# Patient Record
Sex: MALE | Race: BLACK OR AFRICAN AMERICAN | NOT HISPANIC OR LATINO | Employment: FULL TIME | URBAN - METROPOLITAN AREA
[De-identification: names, ages, dates, MRNs, and addresses within clinical notes are randomized per-mention and may not be internally consistent; named-entity substitution may affect disease eponyms.]

---

## 2023-05-10 ENCOUNTER — HOSPITAL ENCOUNTER (EMERGENCY)
Facility: HOSPITAL | Age: 38
Discharge: HOME | End: 2023-05-10
Attending: EMERGENCY MEDICINE
Payer: COMMERCIAL

## 2023-05-10 VITALS
TEMPERATURE: 98 F | HEART RATE: 61 BPM | RESPIRATION RATE: 18 BRPM | DIASTOLIC BLOOD PRESSURE: 66 MMHG | BODY MASS INDEX: 34.33 KG/M2 | SYSTOLIC BLOOD PRESSURE: 126 MMHG | HEIGHT: 68 IN | WEIGHT: 226.5 LBS | OXYGEN SATURATION: 99 %

## 2023-05-10 DIAGNOSIS — R06.02 SOB (SHORTNESS OF BREATH): ICD-10-CM

## 2023-05-10 DIAGNOSIS — R11.2 NAUSEA & VOMITING: ICD-10-CM

## 2023-05-10 LAB
ALBUMIN SERPL-MCNC: 4.5 G/DL (ref 3.5–5)
ALBUMIN/GLOB SERPL: 1.4 RATIO (ref 1.1–2)
ALP SERPL-CCNC: 43 UNIT/L (ref 40–150)
ALT SERPL-CCNC: 12 UNIT/L (ref 0–55)
APPEARANCE UR: CLEAR
AST SERPL-CCNC: 15 UNIT/L (ref 5–34)
BACTERIA #/AREA URNS AUTO: ABNORMAL /HPF
BASOPHILS # BLD AUTO: 0.02 X10(3)/MCL
BASOPHILS NFR BLD AUTO: 0.2 %
BILIRUB UR QL STRIP.AUTO: NEGATIVE MG/DL
BILIRUBIN DIRECT+TOT PNL SERPL-MCNC: 0.4 MG/DL
BUN SERPL-MCNC: 12.2 MG/DL (ref 8.9–20.6)
CALCIUM SERPL-MCNC: 9.9 MG/DL (ref 8.4–10.2)
CHLORIDE SERPL-SCNC: 108 MMOL/L (ref 98–107)
CO2 SERPL-SCNC: 25 MMOL/L (ref 22–29)
COLOR UR AUTO: YELLOW
CREAT SERPL-MCNC: 1.52 MG/DL (ref 0.73–1.18)
EOSINOPHIL # BLD AUTO: 0.02 X10(3)/MCL (ref 0–0.9)
EOSINOPHIL NFR BLD AUTO: 0.2 %
ERYTHROCYTE [DISTWIDTH] IN BLOOD BY AUTOMATED COUNT: 12.5 % (ref 11.5–17)
FLUAV AG UPPER RESP QL IA.RAPID: NOT DETECTED
FLUBV AG UPPER RESP QL IA.RAPID: NOT DETECTED
GFR SERPLBLD CREATININE-BSD FMLA CKD-EPI: >60 MLS/MIN/1.73/M2
GLOBULIN SER-MCNC: 3.3 GM/DL (ref 2.4–3.5)
GLUCOSE SERPL-MCNC: 101 MG/DL (ref 74–100)
GLUCOSE UR QL STRIP.AUTO: NORMAL MG/DL
HCT VFR BLD AUTO: 43.7 % (ref 42–52)
HGB BLD-MCNC: 14.2 G/DL (ref 14–18)
HYALINE CASTS #/AREA URNS LPF: ABNORMAL /LPF
IMM GRANULOCYTES # BLD AUTO: 0.02 X10(3)/MCL (ref 0–0.04)
IMM GRANULOCYTES NFR BLD AUTO: 0.2 %
KETONES UR QL STRIP.AUTO: ABNORMAL MG/DL
LEUKOCYTE ESTERASE UR QL STRIP.AUTO: NEGATIVE UNIT/L
LIPASE SERPL-CCNC: 15 U/L
LYMPHOCYTES # BLD AUTO: 1.28 X10(3)/MCL (ref 0.6–4.6)
LYMPHOCYTES NFR BLD AUTO: 14.5 %
MCH RBC QN AUTO: 27.5 PG (ref 27–31)
MCHC RBC AUTO-ENTMCNC: 32.5 G/DL (ref 33–36)
MCV RBC AUTO: 84.7 FL (ref 80–94)
MONOCYTES # BLD AUTO: 0.63 X10(3)/MCL (ref 0.1–1.3)
MONOCYTES NFR BLD AUTO: 7.1 %
MUCOUS THREADS URNS QL MICRO: ABNORMAL /LPF
NEUTROPHILS # BLD AUTO: 6.87 X10(3)/MCL (ref 2.1–9.2)
NEUTROPHILS NFR BLD AUTO: 77.8 %
NITRITE UR QL STRIP.AUTO: NEGATIVE
NRBC BLD AUTO-RTO: 0 %
PH UR STRIP.AUTO: 7.5 [PH]
PLATELET # BLD AUTO: 310 X10(3)/MCL (ref 130–400)
PMV BLD AUTO: 9.6 FL (ref 7.4–10.4)
POTASSIUM SERPL-SCNC: 3.7 MMOL/L (ref 3.5–5.1)
PROT SERPL-MCNC: 7.8 GM/DL (ref 6.4–8.3)
PROT UR QL STRIP.AUTO: ABNORMAL MG/DL
RBC # BLD AUTO: 5.16 X10(6)/MCL (ref 4.7–6.1)
RBC #/AREA URNS AUTO: ABNORMAL /HPF
RBC UR QL AUTO: NEGATIVE UNIT/L
SARS-COV-2 RNA RESP QL NAA+PROBE: NOT DETECTED
SODIUM SERPL-SCNC: 142 MMOL/L (ref 136–145)
SP GR UR STRIP.AUTO: 1.03
SQUAMOUS #/AREA URNS LPF: ABNORMAL /HPF
UROBILINOGEN UR STRIP-ACNC: ABNORMAL MG/DL
WBC # SPEC AUTO: 8.84 X10(3)/MCL (ref 4.5–11.5)
WBC #/AREA URNS AUTO: ABNORMAL /HPF

## 2023-05-10 PROCEDURE — 81001 URINALYSIS AUTO W/SCOPE: CPT | Performed by: NURSE PRACTITIONER

## 2023-05-10 PROCEDURE — 93005 ELECTROCARDIOGRAM TRACING: CPT

## 2023-05-10 PROCEDURE — 96361 HYDRATE IV INFUSION ADD-ON: CPT

## 2023-05-10 PROCEDURE — 85025 COMPLETE CBC W/AUTO DIFF WBC: CPT | Performed by: NURSE PRACTITIONER

## 2023-05-10 PROCEDURE — 25000003 PHARM REV CODE 250: Performed by: NURSE PRACTITIONER

## 2023-05-10 PROCEDURE — 99285 EMERGENCY DEPT VISIT HI MDM: CPT | Mod: 25

## 2023-05-10 PROCEDURE — 80053 COMPREHEN METABOLIC PANEL: CPT | Performed by: NURSE PRACTITIONER

## 2023-05-10 PROCEDURE — 0240U COVID/FLU A&B PCR: CPT | Performed by: NURSE PRACTITIONER

## 2023-05-10 PROCEDURE — 96374 THER/PROPH/DIAG INJ IV PUSH: CPT

## 2023-05-10 PROCEDURE — 63600175 PHARM REV CODE 636 W HCPCS: Performed by: NURSE PRACTITIONER

## 2023-05-10 PROCEDURE — 83690 ASSAY OF LIPASE: CPT | Performed by: NURSE PRACTITIONER

## 2023-05-10 RX ORDER — ONDANSETRON 2 MG/ML
4 INJECTION INTRAMUSCULAR; INTRAVENOUS
Status: COMPLETED | OUTPATIENT
Start: 2023-05-10 | End: 2023-05-10

## 2023-05-10 RX ORDER — ONDANSETRON 4 MG/1
4 TABLET, ORALLY DISINTEGRATING ORAL EVERY 8 HOURS PRN
Qty: 9 TABLET | Refills: 0 | Status: SHIPPED | OUTPATIENT
Start: 2023-05-10 | End: 2023-05-13

## 2023-05-10 RX ORDER — DICYCLOMINE HYDROCHLORIDE 20 MG/1
20 TABLET ORAL 2 TIMES DAILY
Qty: 20 TABLET | Refills: 0 | Status: SHIPPED | OUTPATIENT
Start: 2023-05-10 | End: 2023-05-20

## 2023-05-10 RX ADMIN — SODIUM CHLORIDE 1000 ML: 9 INJECTION, SOLUTION INTRAVENOUS at 09:05

## 2023-05-10 RX ADMIN — ONDANSETRON 4 MG: 2 INJECTION INTRAMUSCULAR; INTRAVENOUS at 09:05

## 2023-05-10 NOTE — DISCHARGE INSTRUCTIONS
Increase fluid intake, clear liquids x 12 hours. Advance diet slowly.  Follow up with [facility] as discussed to obtain a PCP.  Take medication as prescribed.

## 2023-05-10 NOTE — ED PROVIDER NOTES
Encounter Date: 5/10/2023       History     Chief Complaint   Patient presents with    Abdominal Pain     Pt in with complaints of abdominal pain x 1 week with vomiting and diarrhea that started about 3 days ago.  Pt also reports lack of appetite and feeling sluggish.     Pt is a 37 y.o. male who presents to the [facility] ED complaining of abdominal cramping, nausea, diarrhea, and vomiting. Symptoms present x 1 week. Denies chest pain, SOB, or fever. Admits to decreased appetite since symptoms began. Denies sick contacts at home. Denies chronic medical conditions.     Review of patient's allergies indicates:  No Known Allergies  No past medical history on file.  No past surgical history on file.  No family history on file.  Social History     Tobacco Use    Smoking status: Some Days     Types: Cigars    Smokeless tobacco: Never   Substance Use Topics    Drug use: Yes     Types: Marijuana     Review of Systems   Constitutional:  Negative for chills, diaphoresis, fatigue and fever.   HENT:  Negative for facial swelling, postnasal drip, rhinorrhea, sinus pressure, sinus pain, sore throat and trouble swallowing.    Respiratory:  Negative for cough, chest tightness, shortness of breath and wheezing.    Cardiovascular:  Negative for chest pain, palpitations and leg swelling.   Gastrointestinal:  Positive for abdominal pain, diarrhea, nausea and vomiting.   Genitourinary:  Negative for dysuria, flank pain, hematuria and urgency.   Musculoskeletal:  Negative for arthralgias, back pain and myalgias.   Skin:  Negative for color change and rash.   Neurological:  Negative for dizziness, syncope, weakness and headaches.   Hematological:  Does not bruise/bleed easily.   All other systems reviewed and are negative.    Physical Exam     Initial Vitals [05/10/23 0933]   BP Pulse Resp Temp SpO2   125/78 (!) 57 14 98.3 °F (36.8 °C) 100 %      MAP       --         Physical Exam    Nursing note and vitals reviewed.  Constitutional: Vital  signs are normal. He appears well-developed and well-nourished.   HENT:   Head: Normocephalic.   Nose: Nose normal.   Mouth/Throat: Oropharynx is clear and moist.   Eyes: Conjunctivae and EOM are normal. Pupils are equal, round, and reactive to light.   Neck: Neck supple.   Normal range of motion.  Cardiovascular:  Normal rate, regular rhythm, normal heart sounds and intact distal pulses.           Pulmonary/Chest: Effort normal and breath sounds normal. No respiratory distress. He has no wheezes. He has no rhonchi. He has no rales. He exhibits no tenderness.   Abdominal: Abdomen is soft and flat. Bowel sounds are normal. There is generalized abdominal tenderness. There is no rebound, no guarding, no tenderness at McBurney's point and negative Barakat's sign.   Musculoskeletal:         General: Normal range of motion.      Cervical back: Normal range of motion and neck supple.     Neurological: He is alert and oriented to person, place, and time. He has normal strength.   Skin: Skin is warm and dry. Capillary refill takes less than 2 seconds.   Psychiatric: He has a normal mood and affect. His behavior is normal. Judgment and thought content normal.       ED Course   Procedures  Labs Reviewed   URINALYSIS, REFLEX TO URINE CULTURE - Abnormal; Notable for the following components:       Result Value    Protein, UA 1+ (*)     Ketones, UA 3+ (*)     Urobilinogen, UA 1+ (*)     Bacteria, UA Trace (*)     Squamous Epithelial Cells, UA Trace (*)     Mucous, UA Moderate (*)     All other components within normal limits   COMPREHENSIVE METABOLIC PANEL - Abnormal; Notable for the following components:    Chloride 108 (*)     Glucose Level 101 (*)     Creatinine 1.52 (*)     All other components within normal limits   CBC WITH DIFFERENTIAL - Abnormal; Notable for the following components:    MCHC 32.5 (*)     All other components within normal limits   LIPASE - Normal   CBC W/ AUTO DIFFERENTIAL    Narrative:     The following  orders were created for panel order CBC Auto Differential.  Procedure                               Abnormality         Status                     ---------                               -----------         ------                     CBC with Differential[[id number]]        Abnormal            Final result                 Please view results for these tests on the individual orders.   EXTRA TUBES    Narrative:     The following orders were created for panel order EXTRA TUBES.  Procedure                               Abnormality         Status                     ---------                               -----------         ------                     Light Blue Top Hold[[id number]]                              In process                 Red Top Hold[[id number]]                                     In process                   Please view results for these tests on the individual orders.   LIGHT BLUE TOP HOLD   RED TOP HOLD   COVID/FLU A&B PCR        ECG Results              EKG 12-lead (Chest Pain) Age >30 (In process)  Result time 05/10/23 09:52:31      In process by Interface, Lab In [facility] (05/10/23 09:52:31)                   Narrative:    Test Reason : R07.9,    Vent. Rate : 058 BPM     Atrial Rate : 058 BPM     P-R Int : 138 ms          QRS Dur : 090 ms      QT Int : 422 ms       P-R-T Axes : 027 073 046 degrees     QTc Int : 414 ms    Sinus bradycardia  Otherwise normal ECG  No previous ECGs available    Referred By:             Confirmed By:                                   Imaging Results              X-Ray Chest PA And Lateral (Final result)  Result time 05/10/23 11:04:59      Final result by Chivo Ceron MD (05/10/23 11:04:59)                   Impression:      No acute cardiopulmonary process identified.      Electronically signed by: Chivo Ceron  Date:    05/10/2023  Time:    11:04               Narrative:    EXAMINATION:  XR CHEST PA AND LATERAL    CLINICAL HISTORY:  Shortness of  breath    TECHNIQUE:  Two-view    COMPARISON:  None available.    FINDINGS:  Cardiopericardial silhouette is within normal limits. Lungs are without dense focal or segmental consolidation, congestion, pleural effusion or pneumothorax.                                       X-Ray Abdomen Flat And Erect (Final result)  Result time 05/10/23 11:28:18      Final result by Jada Rodriguez MD (05/10/23 11:28:18)                   Impression:      No acute abdominal radiographic abnormality.      Electronically signed by: Jada Rodriguez  Date:    05/10/2023  Time:    11:28               Narrative:    EXAMINATION:  XR ABDOMEN FLAT AND ERECT    CLINICAL HISTORY:  Nausea with vomiting, unspecified    TECHNIQUE:  Supine and upright views of the abdomen performed.    COMPARISON:  None.    FINDINGS:  No dilated bowel loops. No evidence of obstruction.    No evidence of free intraperitoneal air.    No appreciable acute osseous abnormality.                                       Medications   sodium chloride 0.9% bolus 1,000 mL 1,000 mL (0 mLs Intravenous Stopped 5/10/23 1107)   ondansetron injection 4 mg (4 mg Intravenous Given 5/10/23 0948)     Medical Decision Making:   Differential Diagnosis:   Gastroenteritis  Cholecystitis  Pancreatitis  Abdominal pain  Clinical Tests:   Lab Tests: Ordered and Reviewed  Radiological Study: Ordered and Reviewed  Medical Tests: Ordered and Reviewed  ED Management:  12:15 PM Reassessed patient at this time. Reports condition has improved. Discussed with patient all pertinent ED information and results. Discussed diagnosis and treatment plan with patient. Follow up instructions and return to ED instruction have been given. All questions and concerns were addressed at this time. Patient voices understanding of information and instructions. Patient is comfortable with plan and discharge. Patient is stable for discharge.                           Clinical Impression:   Final diagnoses:  [R11.2]  Nausea & vomiting  [R06.02] SOB (shortness of breath)        ED Disposition Condition    Discharge Stable          ED Prescriptions       Medication Sig Dispense Start Date End Date Auth. Provider    dicyclomine (BENTYL) 20 mg tablet Take 1 tablet (20 mg total) by mouth 2 (two) times daily. for 10 days 20 tablet 5/10/2023 5/20/2023 Matt Harden Jr., INDIAP    ondansetron (ZOFRAN-ODT) 4 MG TbDL Take 1 tablet (4 mg total) by mouth every 8 (eight) hours as needed (Nausea). 9 tablet 5/10/2023 5/13/2023 Matt Harden Jr., KATIUSKA          Follow-up Information       Follow up With Specialties Details Why Contact Info    [facility] - Emergency Dept Emergency Medicine In 3 days As needed, If symptoms worsen [facility] [facility] [address] [phone]    [facility]  Schedule an appointment as soon as possible for a visit in 1 week Follow up with [facility] to obtain a PCP [facility] [facility] [id number]             KATIUSKA Barbosa Jr.  05/10/23 5729

## 2023-05-10 NOTE — Clinical Note
"Grant"Poncho Askew was seen and treated in our emergency department on 5/10/2023.  He may return to work on 05/13/2023.       If you have any questions or concerns, please don't hesitate to call.      Matt Harden Jr., FNP"

## 2023-08-10 ENCOUNTER — HOSPITAL ENCOUNTER (OUTPATIENT)
Dept: RADIOLOGY | Facility: HOSPITAL | Age: 38
Discharge: HOME | End: 2023-08-10
Payer: COMMERCIAL

## 2023-08-10 ENCOUNTER — OFFICE VISIT (OUTPATIENT)
Dept: INTERNAL MEDICINE | Facility: CLINIC | Age: 38
End: 2023-08-10
Payer: COMMERCIAL

## 2023-08-10 VITALS
OXYGEN SATURATION: 97 % | HEIGHT: 68 IN | TEMPERATURE: 98 F | DIASTOLIC BLOOD PRESSURE: 70 MMHG | HEART RATE: 69 BPM | WEIGHT: 237.88 LBS | SYSTOLIC BLOOD PRESSURE: 110 MMHG | BODY MASS INDEX: 36.05 KG/M2 | RESPIRATION RATE: 18 BRPM

## 2023-08-10 DIAGNOSIS — Z13.29 SCREENING FOR THYROID DISORDER: ICD-10-CM

## 2023-08-10 DIAGNOSIS — Z12.5 SCREENING FOR PROSTATE CANCER: ICD-10-CM

## 2023-08-10 DIAGNOSIS — Z13.21 ENCOUNTER FOR VITAMIN DEFICIENCY SCREENING: ICD-10-CM

## 2023-08-10 DIAGNOSIS — Z13.1 SCREENING FOR DIABETES MELLITUS: ICD-10-CM

## 2023-08-10 DIAGNOSIS — R06.02 SOB (SHORTNESS OF BREATH): ICD-10-CM

## 2023-08-10 DIAGNOSIS — M54.50 ACUTE BILATERAL LOW BACK PAIN WITHOUT SCIATICA: Primary | ICD-10-CM

## 2023-08-10 DIAGNOSIS — Z11.4 SCREENING FOR HIV (HUMAN IMMUNODEFICIENCY VIRUS): ICD-10-CM

## 2023-08-10 DIAGNOSIS — Z13.0 SCREENING FOR IRON DEFICIENCY ANEMIA: ICD-10-CM

## 2023-08-10 DIAGNOSIS — Z00.00 WELL ADULT EXAM: ICD-10-CM

## 2023-08-10 DIAGNOSIS — F17.290 CIGAR SMOKER: ICD-10-CM

## 2023-08-10 DIAGNOSIS — Z11.59 SCREENING FOR VIRAL DISEASE: ICD-10-CM

## 2023-08-10 DIAGNOSIS — Z11.3 ROUTINE SCREENING FOR STI (SEXUALLY TRANSMITTED INFECTION): ICD-10-CM

## 2023-08-10 DIAGNOSIS — M54.50 ACUTE BILATERAL LOW BACK PAIN WITHOUT SCIATICA: ICD-10-CM

## 2023-08-10 DIAGNOSIS — Z13.220 SCREENING FOR LIPID DISORDERS: ICD-10-CM

## 2023-08-10 PROCEDURE — 96372 THER/PROPH/DIAG INJ SC/IM: CPT | Mod: PBBFAC

## 2023-08-10 PROCEDURE — 3078F PR MOST RECENT DIASTOLIC BLOOD PRESSURE < 80 MM HG: ICD-10-PCS | Mod: CPTII,,,

## 2023-08-10 PROCEDURE — 99204 PR OFFICE/OUTPT VISIT, NEW, LEVL IV, 45-59 MIN: ICD-10-PCS | Mod: S$PBB,25,,

## 2023-08-10 PROCEDURE — 3074F PR MOST RECENT SYSTOLIC BLOOD PRESSURE < 130 MM HG: ICD-10-PCS | Mod: CPTII,,,

## 2023-08-10 PROCEDURE — 99215 OFFICE O/P EST HI 40 MIN: CPT | Mod: 25,PBBFAC

## 2023-08-10 PROCEDURE — 1159F MED LIST DOCD IN RCRD: CPT | Mod: CPTII,,,

## 2023-08-10 PROCEDURE — 99406 BEHAV CHNG SMOKING 3-10 MIN: CPT | Mod: S$PBB,,,

## 2023-08-10 PROCEDURE — 99204 OFFICE O/P NEW MOD 45 MIN: CPT | Mod: S$PBB,25,,

## 2023-08-10 PROCEDURE — 1159F PR MEDICATION LIST DOCUMENTED IN MEDICAL RECORD: ICD-10-PCS | Mod: CPTII,,,

## 2023-08-10 PROCEDURE — 72110 X-RAY EXAM L-2 SPINE 4/>VWS: CPT | Mod: TC

## 2023-08-10 PROCEDURE — 3008F BODY MASS INDEX DOCD: CPT | Mod: CPTII,,,

## 2023-08-10 PROCEDURE — 99406 PR TOBACCO USE CESSATION INTERMEDIATE 3-10 MINUTES: ICD-10-PCS | Mod: S$PBB,,,

## 2023-08-10 PROCEDURE — 3008F PR BODY MASS INDEX (BMI) DOCUMENTED: ICD-10-PCS | Mod: CPTII,,,

## 2023-08-10 PROCEDURE — 3074F SYST BP LT 130 MM HG: CPT | Mod: CPTII,,,

## 2023-08-10 PROCEDURE — 1160F RVW MEDS BY RX/DR IN RCRD: CPT | Mod: CPTII,,,

## 2023-08-10 PROCEDURE — 1160F PR REVIEW ALL MEDS BY PRESCRIBER/CLIN PHARMACIST DOCUMENTED: ICD-10-PCS | Mod: CPTII,,,

## 2023-08-10 PROCEDURE — 3078F DIAST BP <80 MM HG: CPT | Mod: CPTII,,,

## 2023-08-10 RX ORDER — KETOROLAC TROMETHAMINE 30 MG/ML
30 INJECTION, SOLUTION INTRAMUSCULAR; INTRAVENOUS
Status: COMPLETED | OUTPATIENT
Start: 2023-08-10 | End: 2023-08-10

## 2023-08-10 RX ORDER — METHYLPREDNISOLONE 4 MG/1
TABLET ORAL
Qty: 21 EACH | Refills: 0 | Status: SHIPPED | OUTPATIENT
Start: 2023-08-10 | End: 2023-08-31

## 2023-08-10 RX ORDER — KETOROLAC TROMETHAMINE 10 MG/1
10 TABLET, FILM COATED ORAL EVERY 6 HOURS
Qty: 20 TABLET | Refills: 0 | Status: SHIPPED | OUTPATIENT
Start: 2023-08-10 | End: 2023-08-15

## 2023-08-10 RX ORDER — DEXAMETHASONE SODIUM PHOSPHATE 4 MG/ML
8 INJECTION, SOLUTION INTRA-ARTICULAR; INTRALESIONAL; INTRAMUSCULAR; INTRAVENOUS; SOFT TISSUE
Status: COMPLETED | OUTPATIENT
Start: 2023-08-10 | End: 2023-08-10

## 2023-08-10 RX ADMIN — DEXAMETHASONE SODIUM PHOSPHATE 8 MG: 4 INJECTION, SOLUTION INTRA-ARTICULAR; INTRALESIONAL; INTRAMUSCULAR; INTRAVENOUS; SOFT TISSUE at 02:08

## 2023-08-10 RX ADMIN — KETOROLAC TROMETHAMINE 30 MG: 30 INJECTION, SOLUTION INTRAMUSCULAR; INTRAVENOUS at 02:08

## 2023-08-10 NOTE — PROGRESS NOTES
PATIENT NAME: Grant Askew  : 1985  DATE: 8/10/23  MRN: [id number]          Reason for Visit/Chief Complaint   Establish Care and Back Pain       History of Present Illness (HPI)     Grant Askew is a 38 y.o. Black male presenting in clinic today Establish Care and Back Pain. No previous PCP. No significant medical history.    Patient is experiencing low back pain. Pain 3/10. Pain at highest is 8/10. Denies numbness and tingling of BLE. Denies saddle paresthesia. 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 He states there is lumbar support in the truck.    Smokes cigars, drinks alcohol occasionally, smokes marijuana. Denies chest pain, shortness of breath, cough, headache, dizziness, weakness, abdominal pain, nausea, vomiting, diarrhea, constipation, dysuria, depression, anxiety, SI, and HI.       Review of Systems     Review of Systems   Constitutional: Negative.    HENT: Negative.     Eyes: Negative.    Respiratory: Negative.     Cardiovascular: Negative.    Gastrointestinal: Negative.    Endocrine: Negative.    Genitourinary: Negative.    Musculoskeletal:  Positive for back pain.   Skin: Negative.    Allergic/Immunologic: Negative.    Neurological: Negative.    Hematological: Negative.    Psychiatric/Behavioral: Negative.     All other systems reviewed and are negative.      Medical / Social / Family History   History reviewed. No pertinent past medical history.      History reviewed. No pertinent surgical history.      Social History  Grant Askew's  reports that he has been smoking cigars. He has never used smokeless tobacco. He reports current alcohol use. He reports current drug use. Drug: Marijuana.    Family History  Grant Askew's family history includes Diabetes in his mother; Glaucoma in his mother; Hypertension in his mother.    Medications and Allergies     Medications  Current Outpatient Medications   Medication Instructions    ketorolac  "(TORADOL) 10 mg, Oral, Every 6 hours    methocarbamoL (ROBAXIN) 500 mg, Oral, 3 times daily PRN    methylPREDNISolone (MEDROL DOSEPACK) 4 mg tablet use as directed       Allergies  Review of patient's allergies indicates:  No Known Allergies    Physical Examination   Visit Vitals  /70 (BP Location: Left arm, Patient Position: Sitting, BP Method: Large (Automatic))   Pulse 69   Temp 98.3 °F (36.8 °C) (Oral)   Resp 18   Ht 5' 8" (1.727 m)   Wt 107.9 kg (237 lb 14 oz)   SpO2 97%   BMI 36.17 kg/m²     Physical Exam  Vitals reviewed.   Constitutional:       Appearance: Normal appearance. He is normal weight.   HENT:      Head: Normocephalic.      Nose: Nose normal.      Mouth/Throat:      Mouth: Mucous membranes are moist.      Pharynx: Oropharynx is clear.   Eyes:      Extraocular Movements: Extraocular movements intact.      Conjunctiva/sclera: Conjunctivae normal.      Pupils: Pupils are equal, round, and reactive to light.   Cardiovascular:      Rate and Rhythm: Normal rate and regular rhythm.      Heart sounds: Normal heart sounds.   Pulmonary:      Effort: Pulmonary effort is normal.      Breath sounds: Normal breath sounds.   Abdominal:      General: Abdomen is flat. Bowel sounds are normal.      Palpations: Abdomen is soft.   Musculoskeletal:         General: Normal range of motion.      Cervical back: Normal and normal range of motion.      Thoracic back: Normal.      Lumbar back: Spasms and tenderness present. Normal range of motion.        Back:       Comments: TTP   Skin:     General: Skin is warm and dry.      Capillary Refill: Capillary refill takes less than 2 seconds.   Neurological:      General: No focal deficit present.      Mental Status: He is alert and oriented to person, place, and time.   Psychiatric:         Mood and Affect: Mood normal.           Results     Lab Results   Component Value Date    WBC 8.84 05/10/2023    RBC 5.16 05/10/2023    HGB 14.2 05/10/2023    HCT 43.7 05/10/2023    MCV " "84.7 05/10/2023    MCH 27.5 05/10/2023    MCHC 32.5 (L) 05/10/2023    RDW 12.5 05/10/2023     05/10/2023    MPV 9.6 05/10/2023      Lab Results   Component Value Date     05/10/2023    K 3.7 05/10/2023    CHLORIDE 108 (H) 05/10/2023    CO2 25 05/10/2023    GLUCOSE 101 (H) 05/10/2023    BUN 12.2 05/10/2023    CREATININE 1.52 (H) 05/10/2023    LABPROT 7.8 05/10/2023    ALBUMIN 4.5 05/10/2023    BILITOT 0.4 05/10/2023    ALKPHOS 43 05/10/2023    AST 15 05/10/2023    ALT 12 05/10/2023    EGFRNORACEVR >60 05/10/2023     No results found for: "TSH", "T4FREE"  No results found for: "CHOL", "HDL", "LDL", "TRIG"  Lab Results   Component Value Date    COLORUA Yellow 05/10/2023    SGUA 1.033 05/10/2023    PROTEINUA 1+ (A) 05/10/2023    GLUCOSEUA Normal 05/10/2023    BILIRUBINUA Negative 05/10/2023    BLOODUA Negative 05/10/2023    WBCUA 0-5 05/10/2023    RBCUA 0-5 05/10/2023    BACTERIA Trace (A) 05/10/2023    NITRITESUA Negative 05/10/2023    LEUKOCYTESUR Negative 05/10/2023    UROBILINOGEN 1+ (A) 05/10/2023     No results found for: "MICROALBCREA", "CREATRANDUR", "MICALBCREAT"  No results found for: "QOZJHEQB19GH", "V12", "FOLATE"  No results found for: "HIV", "HEPAIGM", "HEPBCOREM", "HEPBSAG", "HEPCAB"  No results found for: "FITDIAG", "COLOGUARD"  No results found for: "OCCBLDIA"    Assessment and Plan (including Health Maintenance)     Problem List Items Addressed This Visit          Endocrine    BMI 36.0-36.9,adult    Current Assessment & Plan     Body mass index is 36.17 kg/m².  Goal BMI <30.  Aerobic exercise 150 minutes per week.  Avoid soda, simple sugars, sweets, excessive rice, pasta, potatoes or bread.   Choose brown options when available and portion control.  Limit fast foods and fried foods.   Choose complex carbs in moderation (ex: green, leafy vegetables, beans, oatmeal).  Eat plenty of fresh fruits and vegetables with lean meats daily.   Consider permanent healthy lifestyle changes.             " Orthopedic    Acute bilateral low back pain without sciatica - Primary    Current Assessment & Plan     Ketorolac 30mg IM x1 in clinic.  Dexamethasone 8 mg IM x1 in clinic.  Rx ketorolac.  Rx medrol dose pack.   Rx methocarbamol.  Lumbar xray ordered.  Perform back stretches daily.   Avoid activities than increase back pain or stiffness.   Apply heating pad, ice pack, and BioFreeze as needed; alternate every 15-20 minutes.   Massage back to loosen muscles.   Continue tylenol arthritis/NSAID/muscle relaxer as needed.          Relevant Medications    methylPREDNISolone (MEDROL DOSEPACK) 4 mg tablet    ketorolac (TORADOL) 10 mg tablet    methocarbamoL (ROBAXIN) 500 MG Tab    Other Relevant Orders    X-Ray Lumbar Spine 5 View (Completed)       Other    Cigar smoker    Current Assessment & Plan     Smoking cessation discussed for 3 minutes.   Pt not ready to quit.  Declines referral to Smoking Cessation program.  Discussed benefits of quitting including improved health, decreased cardiac/vascular/pulmonary/stroke risks as well as saving money.           Other Visit Diagnoses       Nausea & vomiting        SOB (shortness of breath)        Well adult exam        Relevant Orders    CBC Auto Differential    Comprehensive Metabolic Panel    Microalbumin/Creatinine Ratio, Urine    Urinalysis, Reflex to Urine Culture    Encounter for vitamin deficiency screening        Relevant Orders    Vitamin D    Routine screening for STI (sexually transmitted infection)        Relevant Orders    Chlamydia/GC, PCR    SYPHILIS ANTIBODY (WITH REFLEX RPR)    Screening for diabetes mellitus        Relevant Orders    Hemoglobin A1C    Screening for HIV (human immunodeficiency virus)        Relevant Orders    HIV 1/2 Ag/Ab (4th Gen)    Screening for iron deficiency anemia        Relevant Orders    Ferritin    Iron and TIBC    Screening for prostate cancer        Relevant Orders    PSA, Screening    Screening for thyroid disorder        Relevant  Orders    T4, Free    TSH    Screening for lipid disorders        Relevant Orders    Lipid Panel    Screening for viral disease        Relevant Orders    Hepatitis Panel, Acute             Health Maintenance Due   Topic Date Due    Hepatitis C Screening  Never done    Lipid Panel  Never done    COVID-19 Vaccine (1) Never done    Pneumococcal Vaccines (Age 0-64) (1 - PCV) Never done    HIV Screening  Never done    Hemoglobin A1c (Diabetic Prevention Screening)  Never done     All of your core healthy metrics are met.      Health Maintenance Topics with due status: Not Due       Topic Last Completion Date    TETANUS VACCINE 08/02/2022    Influenza Vaccine Not Due       Future Appointments   Date Time Provider Department Center   9/14/2023  1:00 PM Bri Vaughn FNP [facility]        Follow up in about 4 weeks (around 9/7/2023) for F2F, Follow up, Med check, Lab review, Wellness, RTC PRN.          Signature:        KATIUSKA Gómez  [facility]  [address] [facility] [address]    Date of encounter: 8/10/23

## 2023-08-10 NOTE — ASSESSMENT & PLAN NOTE
Body mass index is 36.17 kg/m².  Goal BMI <30.  Aerobic exercise 150 minutes per week.  Avoid soda, simple sugars, sweets, excessive rice, pasta, potatoes or bread.   Choose brown options when available and portion control.  Limit fast foods and fried foods.   Choose complex carbs in moderation (ex: green, leafy vegetables, beans, oatmeal).  Eat plenty of fresh fruits and vegetables with lean meats daily.   Consider permanent healthy lifestyle changes.

## 2023-08-11 RX ORDER — METHOCARBAMOL 500 MG/1
500 TABLET, FILM COATED ORAL 3 TIMES DAILY PRN
Qty: 30 TABLET | Refills: 1 | Status: SHIPPED | OUTPATIENT
Start: 2023-08-11 | End: 2023-10-19 | Stop reason: SDUPTHER

## 2023-08-11 NOTE — ASSESSMENT & PLAN NOTE
Ketorolac 30mg IM x1 in clinic.  Dexamethasone 8 mg IM x1 in clinic.  Rx ketorolac.  Rx medrol dose pack.   Rx methocarbamol.  Lumbar xray ordered.  Perform back stretches daily.   Avoid activities than increase back pain or stiffness.   Apply heating pad, ice pack, and BioFreeze as needed; alternate every 15-20 minutes.   Massage back to loosen muscles.   Continue tylenol arthritis/NSAID/muscle relaxer as needed.

## 2023-10-19 ENCOUNTER — OFFICE VISIT (OUTPATIENT)
Dept: INTERNAL MEDICINE | Facility: CLINIC | Age: 38
End: 2023-10-19
Payer: COMMERCIAL

## 2023-10-19 VITALS
TEMPERATURE: 98 F | OXYGEN SATURATION: 96 % | BODY MASS INDEX: 36.95 KG/M2 | WEIGHT: 243.81 LBS | HEIGHT: 68 IN | RESPIRATION RATE: 18 BRPM | SYSTOLIC BLOOD PRESSURE: 117 MMHG | HEART RATE: 66 BPM | DIASTOLIC BLOOD PRESSURE: 77 MMHG

## 2023-10-19 DIAGNOSIS — Z00.00 WELL ADULT EXAM: Primary | ICD-10-CM

## 2023-10-19 DIAGNOSIS — G89.29 CHRONIC BILATERAL LOW BACK PAIN WITHOUT SCIATICA: ICD-10-CM

## 2023-10-19 DIAGNOSIS — E78.2 MIXED HYPERLIPIDEMIA: ICD-10-CM

## 2023-10-19 DIAGNOSIS — M54.50 CHRONIC BILATERAL LOW BACK PAIN WITHOUT SCIATICA: ICD-10-CM

## 2023-10-19 DIAGNOSIS — E66.09 CLASS 2 OBESITY DUE TO EXCESS CALORIES WITHOUT SERIOUS COMORBIDITY WITH BODY MASS INDEX (BMI) OF 37.0 TO 37.9 IN ADULT: ICD-10-CM

## 2023-10-19 DIAGNOSIS — F17.210 CIGARETTE NICOTINE DEPENDENCE, UNCOMPLICATED: ICD-10-CM

## 2023-10-19 PROBLEM — E66.812 CLASS 2 OBESITY DUE TO EXCESS CALORIES WITHOUT SERIOUS COMORBIDITY WITH BODY MASS INDEX (BMI) OF 37.0 TO 37.9 IN ADULT: Status: ACTIVE | Noted: 2023-10-19

## 2023-10-19 PROCEDURE — 99213 PR OFFICE/OUTPT VISIT, EST, LEVL III, 20-29 MIN: ICD-10-PCS | Mod: S$PBB,25,,

## 2023-10-19 PROCEDURE — 1160F RVW MEDS BY RX/DR IN RCRD: CPT | Mod: CPTII,,,

## 2023-10-19 PROCEDURE — 3008F BODY MASS INDEX DOCD: CPT | Mod: CPTII,,,

## 2023-10-19 PROCEDURE — 99395 PREV VISIT EST AGE 18-39: CPT | Mod: S$PBB,,,

## 2023-10-19 PROCEDURE — 3074F SYST BP LT 130 MM HG: CPT | Mod: CPTII,,,

## 2023-10-19 PROCEDURE — 3044F HG A1C LEVEL LT 7.0%: CPT | Mod: CPTII,,,

## 2023-10-19 PROCEDURE — 3078F PR MOST RECENT DIASTOLIC BLOOD PRESSURE < 80 MM HG: ICD-10-PCS | Mod: CPTII,,,

## 2023-10-19 PROCEDURE — 3074F PR MOST RECENT SYSTOLIC BLOOD PRESSURE < 130 MM HG: ICD-10-PCS | Mod: CPTII,,,

## 2023-10-19 PROCEDURE — 1160F PR REVIEW ALL MEDS BY PRESCRIBER/CLIN PHARMACIST DOCUMENTED: ICD-10-PCS | Mod: CPTII,,,

## 2023-10-19 PROCEDURE — 99213 OFFICE O/P EST LOW 20 MIN: CPT | Mod: S$PBB,25,,

## 2023-10-19 PROCEDURE — 1159F PR MEDICATION LIST DOCUMENTED IN MEDICAL RECORD: ICD-10-PCS | Mod: CPTII,,,

## 2023-10-19 PROCEDURE — 99395 PR PREVENTIVE VISIT,EST,18-39: ICD-10-PCS | Mod: S$PBB,,,

## 2023-10-19 PROCEDURE — 1159F MED LIST DOCD IN RCRD: CPT | Mod: CPTII,,,

## 2023-10-19 PROCEDURE — 99215 OFFICE O/P EST HI 40 MIN: CPT | Mod: PBBFAC

## 2023-10-19 PROCEDURE — 3008F PR BODY MASS INDEX (BMI) DOCUMENTED: ICD-10-PCS | Mod: CPTII,,,

## 2023-10-19 PROCEDURE — 3078F DIAST BP <80 MM HG: CPT | Mod: CPTII,,,

## 2023-10-19 PROCEDURE — 3044F PR MOST RECENT HEMOGLOBIN A1C LEVEL <7.0%: ICD-10-PCS | Mod: CPTII,,,

## 2023-10-19 RX ORDER — IBUPROFEN 800 MG/1
800 TABLET ORAL 3 TIMES DAILY PRN
Qty: 60 TABLET | Refills: 1 | Status: SHIPPED | OUTPATIENT
Start: 2023-10-19 | End: 2024-10-18

## 2023-10-19 RX ORDER — METHOCARBAMOL 500 MG/1
500 TABLET, FILM COATED ORAL 3 TIMES DAILY PRN
Qty: 30 TABLET | Refills: 1 | Status: SHIPPED | OUTPATIENT
Start: 2023-10-19 | End: 2024-10-18

## 2023-10-19 NOTE — ASSESSMENT & PLAN NOTE
Referral to [facility] in [place], [place].  Lumbar xray reveals - minimal degenerative changes.  Continue methocarbamol.  Rx ibuprofen.  Perform back stretches daily.   Avoid activities than increase back pain or stiffness.   Apply heating pad, ice pack, and BioFreeze as needed; alternate every 15-20 minutes.   Massage back to loosen muscles.

## 2023-10-19 NOTE — PROGRESS NOTES
PATIENT NAME: Grant Askew  : 1985  DATE: 10/19/23  MRN: [id number]          Reason for Visit/Chief Complaint   Follow-up and Labs Only       History of Present Illness (HPI)     Grant Askew is a 38 y.o. Black male presenting in clinic today Follow-up and Labs Only. No significant medical history.    All pertinent labs dated 2023 and diagnostic tests reviewed and discussed with patient.     (8/10/2023) Patient is experiencing low back pain. Pain 3/10. Pain at highest is 8/10. Denies numbness and tingling of BLE. Denies saddle paresthesia. 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 He states there is lumbar support in the truck. He was given ketorolac and medrol dose pack. He was also prescribed methocarbamol. Lumbar xray reveals minimal degenerative changes. He reports back pain is better and methocarbamol helps. He is amendable to PT.    Smokes cigars, drinks alcohol occasionally, smokes marijuana. Denies chest pain, shortness of breath, cough, headache, dizziness, weakness, abdominal pain, nausea, vomiting, diarrhea, constipation, dysuria, depression, anxiety, SI, and HI.     Prostate Cancer Screening - 2023 - PSA: 0.49. Follow up annually.   Colon Cancer Screening - Deferred due to age.   Osteoporosis Screening - Deferred due to age.   Lung Cancer Screening- Deferred due to age.   Eye Exam -Several years. List of local eye doctors given to patient.   Dental Exam - Several years. List of local dentists given to patient.   Vaccinations: Flu - Declines / Pneumococcal - Declines / Tetanus - 2022   Review of Systems     Review of Systems   Constitutional: Negative.    HENT: Negative.     Eyes: Negative.    Respiratory: Negative.     Cardiovascular: Negative.    Gastrointestinal: Negative.    Endocrine: Negative.    Genitourinary: Negative.    Musculoskeletal:  Positive for back pain.   Skin: Negative.    Allergic/Immunologic: Negative.   "  Neurological: Negative.    Hematological: Negative.    Psychiatric/Behavioral: Negative.     All other systems reviewed and are negative.      Medical / Social / Family History   History reviewed. No pertinent past medical history.    History reviewed. No pertinent surgical history.    Social History  Grant Fernandez  reports that he has been smoking cigars. He has never used smokeless tobacco. He reports current alcohol use. He reports current drug use. Drug: Marijuana.    Family History  Grant Fernandez family history includes Diabetes in his mother; Glaucoma in his mother; Hypertension in his mother.    Medications and Allergies     Medications  Current Outpatient Medications   Medication Instructions    ibuprofen (ADVIL,MOTRIN) 800 mg, Oral, 3 times daily PRN    methocarbamoL (ROBAXIN) 500 mg, Oral, 3 times daily PRN       Allergies  Review of patient's allergies indicates:  No Known Allergies    Physical Examination   Visit Vitals  /77 (BP Location: Left arm, Patient Position: Sitting, BP Method: Large (Automatic))   Pulse 66   Temp 98.3 °F (36.8 °C) (Oral)   Resp 18   Ht 5' 8" (1.727 m)   Wt 110.6 kg (243 lb 13.3 oz)   SpO2 96%   BMI 37.07 kg/m²       Physical Exam  Vitals reviewed.   Constitutional:       Appearance: Normal appearance. He is normal weight.   HENT:      Head: Normocephalic.      Nose: Nose normal.      Mouth/Throat:      Mouth: Mucous membranes are moist.      Pharynx: Oropharynx is clear.   Eyes:      Extraocular Movements: Extraocular movements intact.      Conjunctiva/sclera: Conjunctivae normal.      Pupils: Pupils are equal, round, and reactive to light.   Cardiovascular:      Rate and Rhythm: Normal rate and regular rhythm.      Heart sounds: Normal heart sounds.   Pulmonary:      Effort: Pulmonary effort is normal.      Breath sounds: Normal breath sounds.   Abdominal:      General: Abdomen is flat. Bowel sounds are normal.      Palpations: Abdomen is soft.   Musculoskeletal:  "        General: Normal range of motion.      Cervical back: Normal and normal range of motion.      Thoracic back: Normal.      Lumbar back: Spasms and tenderness present. Normal range of motion.        Back:       Comments: TTP   Skin:     General: Skin is warm and dry.      Capillary Refill: Capillary refill takes less than 2 seconds.   Neurological:      General: No focal deficit present.      Mental Status: He is alert and oriented to person, place, and time.   Psychiatric:         Mood and Affect: Mood normal.           Results     Lab Results   Component Value Date    WBC 7.78 09/19/2023    RBC 5.23 09/19/2023    HGB 14.2 09/19/2023    HCT 45.1 09/19/2023    MCV 86.2 09/19/2023    MCH 27.2 09/19/2023    MCHC 31.5 (L) 09/19/2023    RDW 13.2 09/19/2023     09/19/2023    MPV 10.4 09/19/2023      Lab Results   Component Value Date     09/19/2023    K 4.1 09/19/2023    CHLORIDE 104 09/19/2023    CO2 28 09/19/2023    GLUCOSE 87 09/19/2023    BUN 16.5 09/19/2023    CREATININE 1.31 (H) 09/19/2023    LABPROT 7.8 09/19/2023    ALBUMIN 4.6 09/19/2023    BILITOT 0.6 09/19/2023    ALKPHOS 41 09/19/2023    AST 23 09/19/2023    ALT 21 09/19/2023    EGFRNORACEVR >60 09/19/2023     Lab Results   Component Value Date    TSH 2.496 09/19/2023     Lab Results   Component Value Date    CHOL 225 (H) 09/19/2023    HDL 52 09/19/2023    .00 (H) 09/19/2023    TRIG 75 09/19/2023     Lab Results   Component Value Date    COLORUA Light-Yellow 09/19/2023    SGUA 1.025 09/19/2023    PROTEINUA Trace (A) 09/19/2023    GLUCOSEUA Normal 09/19/2023    BILIRUBINUA Negative 09/19/2023    BLOODUA Negative 09/19/2023    WBCUA 0-5 09/19/2023    RBCUA 6-10 (A) 09/19/2023    BACTERIA None Seen 09/19/2023    NITRITESUA Negative 09/19/2023    LEUKOCYTESUR Negative 09/19/2023    UROBILINOGEN Normal 09/19/2023     Lab Results   Component Value Date    CREATRANDUR 232.0 (H) 09/19/2023    MICALBCREAT 2.7 09/19/2023     Lab Results    Component Value Date    GINDMEHP52EW 32.5 09/19/2023     Lab Results   Component Value Date    HIV Nonreactive 09/19/2023    HEPAIGM Nonreactive 09/19/2023    HEPBCOREM Nonreactive 09/19/2023    HEPCAB Nonreactive 09/19/2023     Assessment and Plan (including Health Maintenance)     Problem List Items Addressed This Visit          Cardiac/Vascular    Mixed hyperlipidemia    Current Assessment & Plan     Lab Results   Component Value Date    .00 (H) 09/19/2023       Lab Results   Component Value Date    TRIG 75 09/19/2023       Lab Results   Component Value Date    HDL 52 09/19/2023      Lab Results   Component Value Date    CHOL 225 (H) 09/19/2023   Declines statin therapy at this time.  Repeat lipid panel in 6 months, will prescribe statin if LDL and total cholesterol remains elevated.  Follow a low cholesterol, low saturated fat diet with less than 200 mg of cholesterol a day.   Avoid fried foods and high saturated fats.  Add flax seed or fish oil supplements to diet.   Increase dietary fiber.   Regular exercise improves cholesterol levels.  Physical activity 5 times a week for 30 minutes per day (or 150 minutes per week).   Stressed importance of dietary modifications.          Relevant Orders    Lipid Panel       Endocrine    Class 2 obesity due to excess calories without serious comorbidity with body mass index (BMI) of 37.0 to 37.9 in adult    Current Assessment & Plan     Body mass index is 37.07 kg/m².  Goal BMI <30.  Aerobic exercise 150 minutes per week.  Avoid soda, simple sugars, sweets, excessive rice, pasta, potatoes or bread.   Choose brown options when available and portion control.  Limit fast foods and fried foods.   Choose complex carbs in moderation (ex: green, leafy vegetables, beans, oatmeal).  Eat plenty of fresh fruits and vegetables with lean meats daily.   Consider permanent healthy lifestyle changes.             Orthopedic    Acute bilateral low back pain without sciatica     Current Assessment & Plan     Referral to [facility] in [place], [place].  Lumbar xray reveals - minimal degenerative changes.  Continue methocarbamol.  Rx ibuprofen.  Perform back stretches daily.   Avoid activities than increase back pain or stiffness.   Apply heating pad, ice pack, and BioFreeze as needed; alternate every 15-20 minutes.   Massage back to loosen muscles.            Relevant Medications    ibuprofen (ADVIL,MOTRIN) 800 MG tablet    methocarbamoL (ROBAXIN) 500 MG Tab       Other    Well adult exam - Primary    Current Assessment & Plan     Wellness labs - 9/19/2023.  Prostate Cancer Screening - 9/19/2023 - PSA: 0.49. Follow up annually.   Colon Cancer Screening - Deferred due to age.   Osteoporosis Screening - Deferred due to age.   Lung Cancer Screening- Deferred due to age.   Eye Exam -Several years. List of local eye doctors given to patient.   Dental Exam - Several years. List of local dentists given to patient.   Vaccinations: Flu - Declines / Pneumococcal - Declines / Tetanus - 8/2/2022             Relevant Orders    Comprehensive Metabolic Panel    Urinalysis, Reflex to Urine Culture    CBC Auto Differential    Cigarette nicotine dependence, uncomplicated    Current Assessment & Plan     Dangers of cigarette smoking were reviewed with patient in detail. Patient was counseled for 3 minutes. Nicotine replacement options were discussed. Nicotine replacement was discussed- not prescribed per patient's request.               Health Maintenance Due   Topic Date Due    COVID-19 Vaccine (1) Never done     All of your core healthy metrics are met.      Health Maintenance Topics with due status: Not Due       Topic Last Completion Date    TETANUS VACCINE 08/02/2022    Hemoglobin A1c (Diabetic Prevention Screening) 09/19/2023    Lipid Panel 09/19/2023       Future Appointments   Date Time Provider Department Center   4/19/2024  9:15 AM [facility]        Follow up in about  6 months (around 4/19/2024) for Virtual Visit, Follow up, Med check, Lab review, RTC PRN.          Signature:        KATIUSKA Gómez  [facility]  [address] [id number]    Date of encounter: 10/19/23

## 2023-10-19 NOTE — ASSESSMENT & PLAN NOTE
Body mass index is 37.07 kg/m².  Goal BMI <30.  Aerobic exercise 150 minutes per week.  Avoid soda, simple sugars, sweets, excessive rice, pasta, potatoes or bread.   Choose brown options when available and portion control.  Limit fast foods and fried foods.   Choose complex carbs in moderation (ex: green, leafy vegetables, beans, oatmeal).  Eat plenty of fresh fruits and vegetables with lean meats daily.   Consider permanent healthy lifestyle changes.

## 2023-10-19 NOTE — ASSESSMENT & PLAN NOTE
Dangers of cigarette smoking were reviewed with patient in detail. Patient was counseled for 3 minutes. Nicotine replacement options were discussed. Nicotine replacement was discussed- not prescribed per patient's request.

## 2023-10-19 NOTE — PATIENT INSTRUCTIONS
REMINDER: Please complete labs within 1 week of appointment.   Please complete satisfaction survey when received. Thank you.    Jm Burns,     If you are due for any health screening(s) below please notify me so we can arrange them to be ordered and scheduled. Most healthy patients at your age complete them, but you are free to accept or refuse.     If you can't do it, I'll definitely understand. If you can, I'd certainly appreciate it!    All of your core healthy metrics are met.      Were here to help you quit smoking     Our records indicated that you are still smoking. One of the best things you can do for your health is to stop smoking and we are here to help.     Talk with your provider about our Smoking Cessation Program and how we can support you on your journey.

## 2023-10-19 NOTE — ASSESSMENT & PLAN NOTE
Wellness labs - 9/19/2023.  Prostate Cancer Screening - 9/19/2023 - PSA: 0.49. Follow up annually.   Colon Cancer Screening - Deferred due to age.   Osteoporosis Screening - Deferred due to age.   Lung Cancer Screening- Deferred due to age.   Eye Exam -Several years. List of local eye doctors given to patient.   Dental Exam - Several years. List of local dentists given to patient.   Vaccinations: Flu - Declines / Pneumococcal - Declines / Tetanus - 8/2/2022

## 2023-10-19 NOTE — ASSESSMENT & PLAN NOTE
Lab Results   Component Value Date    .00 (H) 09/19/2023       Lab Results   Component Value Date    TRIG 75 09/19/2023       Lab Results   Component Value Date    HDL 52 09/19/2023        Lab Results   Component Value Date    CHOL 225 (H) 09/19/2023   Declines statin therapy at this time.  Repeat lipid panel in 6 months, will prescribe statin if LDL and total cholesterol remains elevated.  Follow a low cholesterol, low saturated fat diet with less than 200 mg of cholesterol a day.   Avoid fried foods and high saturated fats.  Add flax seed or fish oil supplements to diet.   Increase dietary fiber.   Regular exercise improves cholesterol levels.  Physical activity 5 times a week for 30 minutes per day (or 150 minutes per week).   Stressed importance of dietary modifications.

## 2024-01-22 PROBLEM — Z00.00 WELL ADULT EXAM: Status: RESOLVED | Noted: 2023-10-19 | Resolved: 2024-01-22

## 2024-03-19 ENCOUNTER — LAB VISIT (OUTPATIENT)
Dept: LAB | Facility: HOSPITAL | Age: 39
End: 2024-03-19
Payer: COMMERCIAL

## 2024-03-19 ENCOUNTER — OFFICE VISIT (OUTPATIENT)
Dept: INTERNAL MEDICINE | Facility: CLINIC | Age: 39
End: 2024-03-19
Payer: COMMERCIAL

## 2024-03-19 VITALS
BODY MASS INDEX: 37.77 KG/M2 | OXYGEN SATURATION: 99 % | TEMPERATURE: 99 F | HEIGHT: 68 IN | WEIGHT: 249.19 LBS | RESPIRATION RATE: 18 BRPM | SYSTOLIC BLOOD PRESSURE: 135 MMHG | HEART RATE: 69 BPM | DIASTOLIC BLOOD PRESSURE: 89 MMHG

## 2024-03-19 DIAGNOSIS — Z11.3 SCREEN FOR STD (SEXUALLY TRANSMITTED DISEASE): ICD-10-CM

## 2024-03-19 DIAGNOSIS — E66.09 CLASS 2 OBESITY DUE TO EXCESS CALORIES WITHOUT SERIOUS COMORBIDITY WITH BODY MASS INDEX (BMI) OF 37.0 TO 37.9 IN ADULT: ICD-10-CM

## 2024-03-19 DIAGNOSIS — B35.6 JOCK ITCH: ICD-10-CM

## 2024-03-19 DIAGNOSIS — Z00.00 WELL ADULT EXAM: ICD-10-CM

## 2024-03-19 DIAGNOSIS — Z20.2 EXPOSURE TO GENITAL HERPES: ICD-10-CM

## 2024-03-19 DIAGNOSIS — Z11.3 SCREEN FOR STD (SEXUALLY TRANSMITTED DISEASE): Primary | ICD-10-CM

## 2024-03-19 LAB
APPEARANCE UR: CLEAR
BACTERIA #/AREA URNS AUTO: ABNORMAL /HPF
BILIRUB UR QL STRIP.AUTO: NEGATIVE
C TRACH DNA SPEC QL NAA+PROBE: NOT DETECTED
COLOR UR AUTO: COLORLESS
GLUCOSE UR QL STRIP.AUTO: NORMAL
HAV IGM SERPL QL IA: NONREACTIVE
HBV CORE IGM SERPL QL IA: NONREACTIVE
HBV SURFACE AG SERPL QL IA: NONREACTIVE
HCV AB SERPL QL IA: NONREACTIVE
HIV 1+2 AB+HIV1 P24 AG SERPL QL IA: NONREACTIVE
HYALINE CASTS #/AREA URNS LPF: ABNORMAL /LPF
KETONES UR QL STRIP.AUTO: NEGATIVE
LEUKOCYTE ESTERASE UR QL STRIP.AUTO: NEGATIVE
MUCOUS THREADS URNS QL MICRO: ABNORMAL /LPF
N GONORRHOEA DNA SPEC QL NAA+PROBE: NOT DETECTED
NITRITE UR QL STRIP.AUTO: NEGATIVE
PH UR STRIP.AUTO: 6.5 [PH]
PROT UR QL STRIP.AUTO: NEGATIVE
RBC #/AREA URNS AUTO: ABNORMAL /HPF
RBC UR QL AUTO: NEGATIVE
SOURCE (OHS): NORMAL
SP GR UR STRIP.AUTO: 1.01 (ref 1–1.03)
SQUAMOUS #/AREA URNS LPF: ABNORMAL /HPF
T PALLIDUM AB SER QL: NONREACTIVE
UROBILINOGEN UR STRIP-ACNC: NORMAL
WBC #/AREA URNS AUTO: ABNORMAL /HPF

## 2024-03-19 PROCEDURE — 80074 ACUTE HEPATITIS PANEL: CPT

## 2024-03-19 PROCEDURE — 3079F DIAST BP 80-89 MM HG: CPT | Mod: CPTII,,,

## 2024-03-19 PROCEDURE — 3008F BODY MASS INDEX DOCD: CPT | Mod: CPTII,,,

## 2024-03-19 PROCEDURE — 81015 MICROSCOPIC EXAM OF URINE: CPT

## 2024-03-19 PROCEDURE — 87529 HSV DNA AMP PROBE: CPT

## 2024-03-19 PROCEDURE — 87086 URINE CULTURE/COLONY COUNT: CPT

## 2024-03-19 PROCEDURE — 99214 OFFICE O/P EST MOD 30 MIN: CPT | Mod: PBBFAC

## 2024-03-19 PROCEDURE — 87491 CHLMYD TRACH DNA AMP PROBE: CPT

## 2024-03-19 PROCEDURE — 36415 COLL VENOUS BLD VENIPUNCTURE: CPT

## 2024-03-19 PROCEDURE — 3075F SYST BP GE 130 - 139MM HG: CPT | Mod: CPTII,,,

## 2024-03-19 PROCEDURE — 87389 HIV-1 AG W/HIV-1&-2 AB AG IA: CPT

## 2024-03-19 PROCEDURE — 99214 OFFICE O/P EST MOD 30 MIN: CPT | Mod: S$PBB,,,

## 2024-03-19 PROCEDURE — 1159F MED LIST DOCD IN RCRD: CPT | Mod: CPTII,,,

## 2024-03-19 PROCEDURE — 86780 TREPONEMA PALLIDUM: CPT

## 2024-03-19 PROCEDURE — 1160F RVW MEDS BY RX/DR IN RCRD: CPT | Mod: CPTII,,,

## 2024-03-19 RX ORDER — NYSTATIN 100000 [USP'U]/G
POWDER TOPICAL 2 TIMES DAILY
Qty: 30 G | Refills: 0 | Status: SHIPPED | OUTPATIENT
Start: 2024-03-19 | End: 2024-04-02

## 2024-03-19 RX ORDER — FLUCONAZOLE 150 MG/1
150 TABLET ORAL DAILY
Qty: 1 TABLET | Refills: 0 | Status: SHIPPED | OUTPATIENT
Start: 2024-03-19 | End: 2024-03-20

## 2024-03-19 RX ORDER — VALACYCLOVIR HYDROCHLORIDE 1 G/1
1000 TABLET, FILM COATED ORAL 2 TIMES DAILY
Qty: 14 TABLET | Refills: 0 | Status: SHIPPED | OUTPATIENT
Start: 2024-03-19 | End: 2024-03-26

## 2024-03-19 NOTE — PROGRESS NOTES
PATIENT NAME: Grant Askew  : 1985  DATE: 3/19/24  MRN: [id number]          Reason for Visit/Chief Complaint   Exposure to STD       History of Present Illness (HPI)     Grant Askew is a 38 y.o. Black male presenting in clinic today for Exposure to STD. PMH: back pain.    Patient reports being told by his wife that she was informed she has herpes. He denies any vesicles, sores, penile pain or discharge, dysuria. He does endorse itching of genital area due to sweating.      Smokes cigars, drinks alcohol occasionally, smokes marijuana. Denies chest pain, shortness of breath, cough, headache, dizziness, weakness, abdominal pain, nausea, vomiting, diarrhea, constipation, dysuria, depression, anxiety, SI, and HI.     Prostate Cancer Screening - 2023 - PSA: 0.49. Follow up annually.   Colon Cancer Screening - Deferred due to age.   Osteoporosis Screening - Deferred due to age.   Lung Cancer Screening- Deferred due to age.   Eye Exam -Several years. List of local eye doctors given to patient.   Dental Exam - Several years. List of local dentists given to patient.   Vaccinations: Flu - Declines / Pneumococcal - Declines / Tetanus - 2022     Review of Systems     Review of Systems   Constitutional: Negative.    HENT: Negative.     Eyes: Negative.    Respiratory: Negative.     Cardiovascular: Negative.    Gastrointestinal: Negative.    Endocrine: Negative.    Genitourinary: Negative.  Negative for difficulty urinating and dysuria.        Genital itching   Musculoskeletal: Negative.    Skin: Negative.    Allergic/Immunologic: Negative.    Neurological: Negative.    Hematological: Negative.    Psychiatric/Behavioral: Negative.     All other systems reviewed and are negative.      Medical / Social / Family History   History reviewed. No pertinent past medical history.      History reviewed. No pertinent surgical history.      Social History  Grant Askew's  reports that he has been smoking cigars. He  "has never used smokeless tobacco. He reports current alcohol use. He reports current drug use. Drug: Marijuana.    Family History  Grant Askew's family history includes Diabetes in his mother; Glaucoma in his mother; Hypertension in his mother.    Medications and Allergies     Medications  Current Outpatient Medications   Medication Instructions    ibuprofen (ADVIL,MOTRIN) 800 mg, Oral, 3 times daily PRN    methocarbamoL (ROBAXIN) 500 mg, Oral, 3 times daily PRN       Allergies  Review of patient's allergies indicates:  No Known Allergies    Physical Examination   Visit Vitals  /89 (BP Location: Right arm, Patient Position: Sitting, BP Method: Large (Automatic))   Pulse 69   Temp 98.6 °F (37 °C) (Oral)   Resp 18   Ht 5' 8" (1.727 m)   Wt 113 kg (249 lb 3.2 oz)   SpO2 99%   BMI 37.89 kg/m²     Physical Exam  Vitals reviewed.   Constitutional:       Appearance: Normal appearance. He is normal weight.   HENT:      Head: Normocephalic.      Nose: Nose normal.      Mouth/Throat:      Mouth: Mucous membranes are moist.      Pharynx: Oropharynx is clear.   Eyes:      Extraocular Movements: Extraocular movements intact.      Conjunctiva/sclera: Conjunctivae normal.      Pupils: Pupils are equal, round, and reactive to light.   Cardiovascular:      Rate and Rhythm: Normal rate and regular rhythm.      Heart sounds: Normal heart sounds.   Pulmonary:      Effort: Pulmonary effort is normal.      Breath sounds: Normal breath sounds.   Abdominal:      General: Abdomen is flat. Bowel sounds are normal.      Palpations: Abdomen is soft.   Musculoskeletal:         General: Normal range of motion.      Cervical back: Normal range of motion.   Skin:     General: Skin is warm and dry.      Capillary Refill: Capillary refill takes less than 2 seconds.   Neurological:      General: No focal deficit present.      Mental Status: He is alert and oriented to person, place, and time.   Psychiatric:         Mood and Affect: Mood " "normal.           Results     Lab Results   Component Value Date    WBC 7.78 09/19/2023    RBC 5.23 09/19/2023    HGB 14.2 09/19/2023    HCT 45.1 09/19/2023    MCV 86.2 09/19/2023    MCH 27.2 09/19/2023    MCHC 31.5 (L) 09/19/2023    RDW 13.2 09/19/2023     09/19/2023    MPV 10.4 09/19/2023      Lab Results   Component Value Date     09/19/2023    K 4.1 09/19/2023    CHLORIDE 104 09/19/2023    CO2 28 09/19/2023    GLUCOSE 87 09/19/2023    BUN 16.5 09/19/2023    CREATININE 1.31 (H) 09/19/2023    LABPROT 7.8 09/19/2023    ALBUMIN 4.6 09/19/2023    BILITOT 0.6 09/19/2023    ALKPHOS 41 09/19/2023    AST 23 09/19/2023    ALT 21 09/19/2023    EGFRNORACEVR >60 09/19/2023     Lab Results   Component Value Date    TSH 2.496 09/19/2023     Lab Results   Component Value Date    CHOL 225 (H) 09/19/2023    HDL 52 09/19/2023    .00 (H) 09/19/2023    TRIG 75 09/19/2023     Lab Results   Component Value Date    COLORUA Light-Yellow 09/19/2023    SGUA 1.025 09/19/2023    PROTEINUA Trace (A) 09/19/2023    GLUCOSEUA Normal 09/19/2023    BILIRUBINUA Negative 09/19/2023    BLOODUA Negative 09/19/2023    WBCUA 0-5 09/19/2023    RBCUA 6-10 (A) 09/19/2023    BACTERIA None Seen 09/19/2023    NITRITESUA Negative 09/19/2023    LEUKOCYTESUR Negative 09/19/2023    UROBILINOGEN Normal 09/19/2023     Lab Results   Component Value Date    CREATRANDUR 232.0 (H) 09/19/2023    MICALBCREAT 2.7 09/19/2023     Lab Results   Component Value Date    [id number] 32.5 09/19/2023     Lab Results   Component Value Date    HIV Nonreactive 09/19/2023    HEPAIGM Nonreactive 09/19/2023    HEPBCOREM Nonreactive 09/19/2023    HEPCAB Nonreactive 09/19/2023     No results found for: "FITDIAG", "COLOGUARD"  No results found for: "OCCBLDIA"    Assessment and Plan (including Health Maintenance)     Problem List Items Addressed This Visit          Derm    Jock itch    Current Assessment & Plan     Rx fluconazole.  Rx nystatin powder.  Rx " diflucan.  Wear cotton underwear, avoid tight fitting pants.            Relevant Medications    nystatin (MYCOSTATIN) powder    Other Relevant Orders    Urinalysis    Urine culture (Completed)       ID    Screen for STD (sexually transmitted disease) - Primary    Current Assessment & Plan     HIV, HSV 1&2, Trichomoniasis, Gonorrhea, Chlamydia, Hepatitis, Syphilis, UA with culture ordered.         Relevant Orders    SYPHILIS ANTIBODY (WITH REFLEX RPR) (Completed)    Hepatitis Panel, Acute (Completed)    HIV 1/2 Ag/Ab (4th Gen) (Completed)    Chlamydia/GC, PCR (Completed)    T.vaginalisisc, Amplified RNA    Urinalysis    Urine culture (Completed)    Exposure to genital herpes    Current Assessment & Plan     Rx valacyclovir - preventative.  HSV 1 & 2 PCR ordered.           Relevant Medications    valACYclovir (VALTREX) 1000 MG tablet    Other Relevant Orders    Herpes simplex Virus (HSV) Type 1 & 2 DNA by PCR       Endocrine    Class 2 obesity due to excess calories without serious comorbidity with body mass index (BMI) of 37.0 to 37.9 in adult    Current Assessment & Plan     Body mass index is 37.89 kg/m².  Goal BMI <30.  Aerobic exercise 150 minutes per week.  Avoid soda, simple sugars, sweets, excessive rice, pasta, potatoes or bread.   Choose brown options when available and portion control.  Limit fast foods and fried foods.   Choose complex carbs in moderation (ex: green, leafy vegetables, beans, oatmeal).  Eat plenty of fresh fruits and vegetables with lean meats daily.   Consider permanent healthy lifestyle changes.              Health Maintenance Due   Topic Date Due    COVID-19 Vaccine (1) Never done     All of your core healthy metrics are met.      Health Maintenance Topics with due status: Not Due       Topic Last Completion Date    TETANUS VACCINE 08/02/2022    Hemoglobin A1c (Diabetic Prevention Screening) 09/19/2023    Lipid Panel 09/19/2023       Future Appointments   Date Time Provider Department  Center   4/19/2024  9:20 AM Bri Vaughn FNP [facility]        Follow up if symptoms worsen or fail to improve, keep scheduled appt for 4/19/2024.          Signature:        KATIUSKA Gómez  [facility]  [address] [id number]    Date of encounter: 3/19/24

## 2024-03-20 LAB
HSV-1 DNA BY PCR: NEGATIVE
HSV-2 DNA BY PCR: NEGATIVE

## 2024-03-21 LAB — BACTERIA UR CULT: NO GROWTH

## 2024-03-23 PROBLEM — B35.6 JOCK ITCH: Status: ACTIVE | Noted: 2024-03-23

## 2024-03-23 PROBLEM — Z20.2 EXPOSURE TO GENITAL HERPES: Status: ACTIVE | Noted: 2024-03-23

## 2024-03-23 PROBLEM — Z11.3 SCREEN FOR STD (SEXUALLY TRANSMITTED DISEASE): Status: ACTIVE | Noted: 2023-10-19

## 2024-03-23 NOTE — ASSESSMENT & PLAN NOTE
Rx fluconazole.  Rx nystatin powder.  Rx diflucan.  Wear cotton underwear, avoid tight fitting pants.

## 2024-03-23 NOTE — ASSESSMENT & PLAN NOTE
Body mass index is 37.89 kg/m².  Goal BMI <30.  Aerobic exercise 150 minutes per week.  Avoid soda, simple sugars, sweets, excessive rice, pasta, potatoes or bread.   Choose brown options when available and portion control.  Limit fast foods and fried foods.   Choose complex carbs in moderation (ex: green, leafy vegetables, beans, oatmeal).  Eat plenty of fresh fruits and vegetables with lean meats daily.   Consider permanent healthy lifestyle changes.

## 2024-03-27 ENCOUNTER — TELEPHONE (OUTPATIENT)
Dept: INTERNAL MEDICINE | Facility: CLINIC | Age: 39
End: 2024-03-27
Payer: COMMERCIAL

## 2024-03-27 NOTE — TELEPHONE ENCOUNTER
Please call Grant Askew with this information that was sent to the portal, but it was not read.    All STD testing is negative.  (Hepatitis, HIV, Herpes, Syphilis, gonorrhea, chlamydia).    Thank you,    CLIFF Krishna

## 2024-03-27 NOTE — TELEPHONE ENCOUNTER
Pt notified of negative STD testing. Pt verbalized understanding and will call with any questions or concerns.

## 2024-06-27 ENCOUNTER — LAB VISIT (OUTPATIENT)
Dept: LAB | Facility: HOSPITAL | Age: 39
End: 2024-06-27
Payer: COMMERCIAL

## 2024-06-27 ENCOUNTER — OFFICE VISIT (OUTPATIENT)
Dept: INTERNAL MEDICINE | Facility: CLINIC | Age: 39
End: 2024-06-27
Payer: COMMERCIAL

## 2024-06-27 VITALS
OXYGEN SATURATION: 97 % | DIASTOLIC BLOOD PRESSURE: 79 MMHG | BODY MASS INDEX: 36.36 KG/M2 | RESPIRATION RATE: 16 BRPM | WEIGHT: 239.88 LBS | HEART RATE: 85 BPM | SYSTOLIC BLOOD PRESSURE: 125 MMHG | HEIGHT: 68 IN | TEMPERATURE: 98 F

## 2024-06-27 DIAGNOSIS — E66.01 CLASS 2 SEVERE OBESITY DUE TO EXCESS CALORIES WITH SERIOUS COMORBIDITY AND BODY MASS INDEX (BMI) OF 36.0 TO 36.9 IN ADULT: ICD-10-CM

## 2024-06-27 DIAGNOSIS — Z00.00 WELL ADULT EXAM: ICD-10-CM

## 2024-06-27 DIAGNOSIS — G89.29 CHRONIC BILATERAL LOW BACK PAIN WITHOUT SCIATICA: ICD-10-CM

## 2024-06-27 DIAGNOSIS — M54.50 CHRONIC BILATERAL LOW BACK PAIN WITHOUT SCIATICA: ICD-10-CM

## 2024-06-27 DIAGNOSIS — E78.2 MIXED HYPERLIPIDEMIA: Primary | ICD-10-CM

## 2024-06-27 DIAGNOSIS — Z13.0 SCREENING FOR IRON DEFICIENCY ANEMIA: ICD-10-CM

## 2024-06-27 DIAGNOSIS — F17.210 CIGARETTE NICOTINE DEPENDENCE, UNCOMPLICATED: ICD-10-CM

## 2024-06-27 DIAGNOSIS — E78.2 MIXED HYPERLIPIDEMIA: ICD-10-CM

## 2024-06-27 DIAGNOSIS — Z20.2 EXPOSURE TO GENITAL HERPES: ICD-10-CM

## 2024-06-27 DIAGNOSIS — Z12.5 SCREENING FOR PROSTATE CANCER: ICD-10-CM

## 2024-06-27 PROBLEM — E66.812 CLASS 2 SEVERE OBESITY DUE TO EXCESS CALORIES WITH SERIOUS COMORBIDITY AND BODY MASS INDEX (BMI) OF 36.0 TO 36.9 IN ADULT: Status: ACTIVE | Noted: 2024-06-27

## 2024-06-27 PROBLEM — E66.812 CLASS 2 OBESITY DUE TO EXCESS CALORIES WITHOUT SERIOUS COMORBIDITY WITH BODY MASS INDEX (BMI) OF 37.0 TO 37.9 IN ADULT: Status: RESOLVED | Noted: 2023-10-19 | Resolved: 2024-06-27

## 2024-06-27 PROBLEM — E66.09 CLASS 2 OBESITY DUE TO EXCESS CALORIES WITHOUT SERIOUS COMORBIDITY WITH BODY MASS INDEX (BMI) OF 37.0 TO 37.9 IN ADULT: Status: RESOLVED | Noted: 2023-10-19 | Resolved: 2024-06-27

## 2024-06-27 LAB
ALBUMIN SERPL-MCNC: 4.4 G/DL (ref 3.5–5)
ALBUMIN/GLOB SERPL: 1.4 RATIO (ref 1.1–2)
ALP SERPL-CCNC: 41 UNIT/L (ref 40–150)
ALT SERPL-CCNC: 18 UNIT/L (ref 0–55)
ANION GAP SERPL CALC-SCNC: 5 MEQ/L
AST SERPL-CCNC: 24 UNIT/L (ref 5–34)
BASOPHILS # BLD AUTO: 0.03 X10(3)/MCL
BASOPHILS NFR BLD AUTO: 0.3 %
BILIRUB SERPL-MCNC: 0.4 MG/DL
BUN SERPL-MCNC: 17 MG/DL (ref 8.9–20.6)
CALCIUM SERPL-MCNC: 10 MG/DL (ref 8.4–10.2)
CHLORIDE SERPL-SCNC: 109 MMOL/L (ref 98–107)
CHOLEST SERPL-MCNC: 185 MG/DL
CHOLEST/HDLC SERPL: 5 {RATIO} (ref 0–5)
CO2 SERPL-SCNC: 26 MMOL/L (ref 22–29)
CREAT SERPL-MCNC: 1.42 MG/DL (ref 0.73–1.18)
CREAT/UREA NIT SERPL: 12
EOSINOPHIL # BLD AUTO: 0.06 X10(3)/MCL (ref 0–0.9)
EOSINOPHIL NFR BLD AUTO: 0.7 %
ERYTHROCYTE [DISTWIDTH] IN BLOOD BY AUTOMATED COUNT: 13.2 % (ref 11.5–17)
GFR SERPLBLD CREATININE-BSD FMLA CKD-EPI: >60 ML/MIN/1.73/M2
GLOBULIN SER-MCNC: 3.2 GM/DL (ref 2.4–3.5)
GLUCOSE SERPL-MCNC: 94 MG/DL (ref 74–100)
HCT VFR BLD AUTO: 45.4 % (ref 42–52)
HDLC SERPL-MCNC: 37 MG/DL (ref 35–60)
HGB BLD-MCNC: 14.7 G/DL (ref 14–18)
IMM GRANULOCYTES # BLD AUTO: 0.02 X10(3)/MCL (ref 0–0.04)
IMM GRANULOCYTES NFR BLD AUTO: 0.2 %
LDLC SERPL CALC-MCNC: 134 MG/DL (ref 50–140)
LYMPHOCYTES # BLD AUTO: 2.17 X10(3)/MCL (ref 0.6–4.6)
LYMPHOCYTES NFR BLD AUTO: 23.6 %
MCH RBC QN AUTO: 27.6 PG (ref 27–31)
MCHC RBC AUTO-ENTMCNC: 32.4 G/DL (ref 33–36)
MCV RBC AUTO: 85.3 FL (ref 80–94)
MONOCYTES # BLD AUTO: 0.69 X10(3)/MCL (ref 0.1–1.3)
MONOCYTES NFR BLD AUTO: 7.5 %
NEUTROPHILS # BLD AUTO: 6.24 X10(3)/MCL (ref 2.1–9.2)
NEUTROPHILS NFR BLD AUTO: 67.7 %
NRBC BLD AUTO-RTO: 0 %
PLATELET # BLD AUTO: 305 X10(3)/MCL (ref 130–400)
PMV BLD AUTO: 9.5 FL (ref 7.4–10.4)
POTASSIUM SERPL-SCNC: 4.9 MMOL/L (ref 3.5–5.1)
PROT SERPL-MCNC: 7.6 GM/DL (ref 6.4–8.3)
RBC # BLD AUTO: 5.32 X10(6)/MCL (ref 4.7–6.1)
SODIUM SERPL-SCNC: 140 MMOL/L (ref 136–145)
TRIGL SERPL-MCNC: 71 MG/DL (ref 34–140)
VLDLC SERPL CALC-MCNC: 14 MG/DL
WBC # BLD AUTO: 9.21 X10(3)/MCL (ref 4.5–11.5)

## 2024-06-27 PROCEDURE — 85025 COMPLETE CBC W/AUTO DIFF WBC: CPT

## 2024-06-27 PROCEDURE — 80061 LIPID PANEL: CPT

## 2024-06-27 PROCEDURE — 80053 COMPREHEN METABOLIC PANEL: CPT

## 2024-06-27 PROCEDURE — 36415 COLL VENOUS BLD VENIPUNCTURE: CPT

## 2024-06-27 PROCEDURE — 99214 OFFICE O/P EST MOD 30 MIN: CPT | Mod: PBBFAC

## 2024-06-27 RX ORDER — IBUPROFEN 800 MG/1
800 TABLET ORAL 3 TIMES DAILY PRN
Qty: 60 TABLET | Refills: 2 | Status: SHIPPED | OUTPATIENT
Start: 2024-06-27 | End: 2025-06-27

## 2024-06-27 RX ORDER — METHOCARBAMOL 500 MG/1
500 TABLET, FILM COATED ORAL 3 TIMES DAILY PRN
Qty: 30 TABLET | Refills: 2 | Status: SHIPPED | OUTPATIENT
Start: 2024-06-27 | End: 2025-06-27

## 2024-06-27 NOTE — PROGRESS NOTES
PATIENT NAME: Grant Askew  : 1985  DATE: 24  MRN: [id number]          Reason for Visit/Chief Complaint   Hyperlipidemia and Results       History of Present Illness (HPI)     Grant Askew is a 39 y.o. Black male presenting in clinic today for Hyperlipidemia and Results. PMH HLD (diet controlled), low back pain.     All pertinent labs dated 2024 reviewed and discussed with patient. Lipid panel WNL.     Quit smoking cigars on 2024. Drinks alcohol occasionally, smokes marijuana. Denies chest pain, shortness of breath, cough, headache, dizziness, weakness, abdominal pain, nausea, vomiting, diarrhea, constipation, dysuria, depression, anxiety, SI, and HI.     Prostate Cancer Screening - 2023 - PSA: 0.49. Follow up annually.   Colon Cancer Screening - Deferred due to age.   Osteoporosis Screening - Deferred due to age.   Lung Cancer Screening- Deferred due to age.   Eye Exam -Several years. List of local eye doctors given to patient.   Dental Exam - Several years. List of local dentists given to patient.   Vaccinations: Flu - Not currently being offered, recommended annually. / Pneumococcal - Declines / Tetanus - 2022     Review of Systems     Review of Systems   Constitutional: Negative.    HENT: Negative.     Eyes: Negative.    Respiratory: Negative.     Cardiovascular: Negative.    Gastrointestinal: Negative.    Endocrine: Negative.    Genitourinary: Negative.    Musculoskeletal: Negative.    Skin: Negative.    Allergic/Immunologic: Negative.    Neurological: Negative.    Hematological: Negative.    Psychiatric/Behavioral: Negative.     All other systems reviewed and are negative.      Medical / Social / Family History   History reviewed. No pertinent past medical history.      History reviewed. No pertinent surgical history.      Social History  Grant Askew's  reports that he quit smoking about 2 months ago. His smoking use included cigars. He has never used smokeless tobacco.  "He reports current alcohol use. He reports current drug use. Drug: Marijuana.    Family History  Grant Askew's family history includes Diabetes in his mother; Glaucoma in his mother; Hypertension in his mother.    Medications and Allergies     Medications  Current Outpatient Medications   Medication Instructions    ibuprofen (ADVIL,MOTRIN) 800 mg, Oral, 3 times daily PRN    methocarbamoL (ROBAXIN) 500 mg, Oral, 3 times daily PRN       Allergies  Review of patient's allergies indicates:  No Known Allergies    Physical Examination   Visit Vitals  /79 (BP Location: Right arm, Patient Position: Sitting, BP Method: Large (Automatic))   Pulse 85   Temp 98.4 °F (36.9 °C) (Oral)   Resp 16   Ht 5' 8" (1.727 m)   Wt 108.8 kg (239 lb 13.8 oz)   SpO2 97%   BMI 36.47 kg/m²     Physical Exam  Vitals reviewed.   Constitutional:       Appearance: Normal appearance. He is normal weight.   HENT:      Head: Normocephalic.      Nose: Nose normal.      Mouth/Throat:      Mouth: Mucous membranes are moist.      Pharynx: Oropharynx is clear.   Eyes:      Extraocular Movements: Extraocular movements intact.      Conjunctiva/sclera: Conjunctivae normal.      Pupils: Pupils are equal, round, and reactive to light.   Cardiovascular:      Rate and Rhythm: Normal rate and regular rhythm.      Heart sounds: Normal heart sounds.   Pulmonary:      Effort: Pulmonary effort is normal.      Breath sounds: Normal breath sounds.   Abdominal:      General: Abdomen is flat. Bowel sounds are normal.      Palpations: Abdomen is soft.   Musculoskeletal:         General: Normal range of motion.      Cervical back: Normal range of motion.   Skin:     General: Skin is warm and dry.      Capillary Refill: Capillary refill takes less than 2 seconds.   Neurological:      General: No focal deficit present.      Mental Status: He is alert and oriented to person, place, and time.   Psychiatric:         Mood and Affect: Mood normal.           Results     Lab " Results   Component Value Date    WBC 9.21 06/27/2024    RBC 5.32 06/27/2024    HGB 14.7 06/27/2024    HCT 45.4 06/27/2024    MCV 85.3 06/27/2024    MCH 27.6 06/27/2024    MCHC 32.4 (L) 06/27/2024    RDW 13.2 06/27/2024     06/27/2024    MPV 9.5 06/27/2024      Lab Results   Component Value Date     06/27/2024    K 4.9 06/27/2024    CO2 26 06/27/2024    GLUCOSE 94 06/27/2024    BUN 17.0 06/27/2024    CREATININE 1.42 (H) 06/27/2024    LABPROT 7.6 06/27/2024    ALBUMIN 4.4 06/27/2024    BILITOT 0.4 06/27/2024    ALKPHOS 41 06/27/2024    AST 24 06/27/2024    ALT 18 06/27/2024    AGAP 5.0 06/27/2024    EGFRNORACEVR >60 06/27/2024     Lab Results   Component Value Date    TSH 2.496 09/19/2023     Lab Results   Component Value Date    CHOL 185 06/27/2024    HDL 37 06/27/2024    .00 06/27/2024    TRIG 71 06/27/2024     Lab Results   Component Value Date    SGUA 1.011 03/19/2024    PROTEINUA Negative 03/19/2024    BILIRUBINUA Negative 03/19/2024    WBCUA 0-5 03/19/2024    RBCUA 0-5 03/19/2024    BACTERIA None Seen 03/19/2024    LEUKOCYTESUR Negative 03/19/2024    UROBILINOGEN Normal 03/19/2024     Lab Results   Component Value Date    CREATRANDUR 232.0 (H) 09/19/2023    MICALBCREAT 2.7 09/19/2023     Lab Results   Component Value Date    [id number] 32.5 09/19/2023     Lab Results   Component Value Date    HIV Nonreactive 03/19/2024    HEPAIGM Nonreactive 03/19/2024    HEPBSAG Nonreactive 03/19/2024    HEPCAB Nonreactive 03/19/2024       Assessment and Plan (including Health Maintenance)     Problem List Items Addressed This Visit          Cardiac/Vascular    Mixed hyperlipidemia - Primary    Current Assessment & Plan     Lab Results   Component Value Date    .00 06/27/2024       Lab Results   Component Value Date    TRIG 71 06/27/2024       Lab Results   Component Value Date    HDL 37 06/27/2024        Lab Results   Component Value Date    CHOL 185 06/27/2024   Improved - no need for statin at  this time.  Follow a low cholesterol, low saturated fat diet with less than 200 mg of cholesterol a day.   Avoid fried foods and high saturated fats.  Add flax seed or fish oil supplements to diet.   Increase dietary fiber.   Regular exercise improves cholesterol levels.  Physical activity 5 times a week for 30 minutes per day (or 150 minutes per week).   Stressed importance of dietary modifications.          Relevant Orders    Urinalysis, Reflex to Urine Culture    Lipid Panel    Comprehensive Metabolic Panel       Endocrine    Class 2 severe obesity due to excess calories with serious comorbidity and body mass index (BMI) of 36.0 to 36.9 in adult    Current Assessment & Plan     Body mass index is 36.47 kg/m².  Goal BMI <30.  Aerobic exercise 150 minutes per week.  Avoid soda, simple sugars, sweets, excessive rice, pasta, potatoes or bread.   Choose brown options when available and portion control.  Limit fast foods and fried foods.   Choose complex carbs in moderation (ex: green, leafy vegetables, beans, oatmeal).  Eat plenty of fresh fruits and vegetables with lean meats daily.   Consider permanent healthy lifestyle changes.          Relevant Orders    Vitamin D    TSH    T4, Free    Microalbumin/Creatinine Ratio, Urine    Hemoglobin A1C    CBC Auto Differential       Orthopedic    Chronic bilateral low back pain without sciatica    Current Assessment & Plan     Improved.  Lumbar xray reveals - minimal degenerative changes.  Continue methocarbamol and ibuprofen as prescribed - refilled today.  Perform back stretches daily.   Avoid activities than increase back pain or stiffness.   Apply heating pad, ice pack, and BioFreeze as needed; alternate every 15-20 minutes.   Massage back to loosen muscles.            Relevant Medications    ibuprofen (ADVIL,MOTRIN) 800 MG tablet    methocarbamoL (ROBAXIN) 500 MG Tab       Other    Cigarette nicotine dependence, uncomplicated    Current Assessment & Plan     Quit smoking  on 4/1/2024.  Continued smoking cessation encouraged.          Other Visit Diagnoses       Screening for iron deficiency anemia        Relevant Orders    Iron and TIBC    Ferritin    Screening for prostate cancer        Relevant Orders    PSA, Screening             Health Maintenance Due   Topic Date Due    COVID-19 Vaccine (1 - 2023-24 season) Never done     Tests to Keep You Healthy    Tobacco Cessation: NO      Health Maintenance Topics with due status: Not Due       Topic Last Completion Date    TETANUS VACCINE 08/02/2022    Hemoglobin A1c (Diabetic Prevention Screening) 09/19/2023    Lipid Panel 06/27/2024    Influenza Vaccine Not Due       Future Appointments   Date Time Provider Department Center   12/31/2024  2:40 PM Bri Vaughn FNP [facility]        Follow up in about 6 months (around 12/27/2024) for F2F, Follow up, Med check, Lab review, RTC PRN.          Signature:        KATIUSKA Gómez  [facility]  [address] [facility] [address] [facility] [id number]    Date of encounter: 6/27/24

## 2024-06-27 NOTE — ASSESSMENT & PLAN NOTE
Body mass index is 36.47 kg/m².  Goal BMI <30.  Aerobic exercise 150 minutes per week.  Avoid soda, simple sugars, sweets, excessive rice, pasta, potatoes or bread.   Choose brown options when available and portion control.  Limit fast foods and fried foods.   Choose complex carbs in moderation (ex: green, leafy vegetables, beans, oatmeal).  Eat plenty of fresh fruits and vegetables with lean meats daily.   Consider permanent healthy lifestyle changes.

## 2024-06-27 NOTE — ASSESSMENT & PLAN NOTE
Lab Results   Component Value Date    .00 06/27/2024       Lab Results   Component Value Date    TRIG 71 06/27/2024       Lab Results   Component Value Date    HDL 37 06/27/2024        Lab Results   Component Value Date    CHOL 185 06/27/2024   Improved - no need for statin at this time.  Follow a low cholesterol, low saturated fat diet with less than 200 mg of cholesterol a day.   Avoid fried foods and high saturated fats.  Add flax seed or fish oil supplements to diet.   Increase dietary fiber.   Regular exercise improves cholesterol levels.  Physical activity 5 times a week for 30 minutes per day (or 150 minutes per week).   Stressed importance of dietary modifications.

## 2024-06-27 NOTE — PATIENT INSTRUCTIONS
REMINDER: Please complete labs within 1 week of appointment.   Please complete satisfaction survey when received. Thank you.    Jm Burns,     If you are due for any health screening(s) below please notify me so we can arrange them to be ordered and scheduled. Most healthy patients at your age complete them, but you are free to accept or refuse.     If you can't do it, I'll definitely understand. If you can, I'd certainly appreciate it!    Tests to Keep You Healthy    Tobacco Cessation: NO

## 2024-06-27 NOTE — ASSESSMENT & PLAN NOTE
Improved.  Lumbar xray reveals - minimal degenerative changes.  Continue methocarbamol and ibuprofen as prescribed - refilled today.  Perform back stretches daily.   Avoid activities than increase back pain or stiffness.   Apply heating pad, ice pack, and BioFreeze as needed; alternate every 15-20 minutes.   Massage back to loosen muscles.

## 2025-03-31 ENCOUNTER — OFFICE VISIT (OUTPATIENT)
Dept: INTERNAL MEDICINE | Facility: CLINIC | Age: 40
End: 2025-03-31

## 2025-03-31 VITALS
BODY MASS INDEX: 38.3 KG/M2 | RESPIRATION RATE: 16 BRPM | WEIGHT: 252.69 LBS | TEMPERATURE: 98 F | DIASTOLIC BLOOD PRESSURE: 73 MMHG | HEART RATE: 90 BPM | OXYGEN SATURATION: 99 % | HEIGHT: 68 IN | SYSTOLIC BLOOD PRESSURE: 113 MMHG

## 2025-03-31 DIAGNOSIS — E66.812 CLASS 2 SEVERE OBESITY DUE TO EXCESS CALORIES WITH SERIOUS COMORBIDITY AND BODY MASS INDEX (BMI) OF 38.0 TO 38.9 IN ADULT: ICD-10-CM

## 2025-03-31 DIAGNOSIS — E66.01 CLASS 2 SEVERE OBESITY DUE TO EXCESS CALORIES WITH SERIOUS COMORBIDITY AND BODY MASS INDEX (BMI) OF 38.0 TO 38.9 IN ADULT: ICD-10-CM

## 2025-03-31 DIAGNOSIS — F17.290 CIGAR SMOKER: ICD-10-CM

## 2025-03-31 DIAGNOSIS — B35.6 TINEA CRURIS: Primary | ICD-10-CM

## 2025-03-31 PROCEDURE — 99214 OFFICE O/P EST MOD 30 MIN: CPT | Mod: PBBFAC

## 2025-03-31 PROCEDURE — 99406 BEHAV CHNG SMOKING 3-10 MIN: CPT | Mod: S$PBB,,,

## 2025-03-31 PROCEDURE — 99213 OFFICE O/P EST LOW 20 MIN: CPT | Mod: S$PBB,25,,

## 2025-03-31 RX ORDER — NYSTATIN 100000 [USP'U]/G
POWDER TOPICAL 2 TIMES DAILY
Qty: 30 G | Refills: 0 | Status: SHIPPED | OUTPATIENT
Start: 2025-03-31 | End: 2025-04-14

## 2025-03-31 RX ORDER — FLUCONAZOLE 150 MG/1
150 TABLET ORAL DAILY
Qty: 1 TABLET | Refills: 0 | Status: SHIPPED | OUTPATIENT
Start: 2025-03-31 | End: 2025-04-01

## 2025-03-31 NOTE — ASSESSMENT & PLAN NOTE
Body mass index is 38.42 kg/m².  Goal BMI <30.  Aerobic exercise 150 minutes per week.  Avoid soda, simple sugars, sweets, excessive rice, pasta, potatoes or bread.   Choose brown options when available and portion control.  Limit fast foods and fried foods.   Choose complex carbs in moderation (ex: green, leafy vegetables, beans, oatmeal).  Eat plenty of fresh fruits and vegetables with lean meats daily.   Consider permanent healthy lifestyle changes.

## 2025-03-31 NOTE — PATIENT INSTRUCTIONS
REMINDER: Please complete labs within 1 week of appointment.   Please complete satisfaction survey when received. Thank you.    Jm Burns,     If you are due for any health screening(s) below please notify me so we can arrange them to be ordered and scheduled. Most healthy patients at your age complete them, but you are free to accept or refuse.     If you can't do it, I'll definitely understand. If you can, I'd certainly appreciate it!    All of your core healthy metrics are met.

## 2025-03-31 NOTE — PROGRESS NOTES
PATIENT NAME: Grant Askew  : 1985  DATE: 3/31/25  MRN: [id number]          Reason for Visit/Chief Complaint   Recurrent Skin Infections (Jock itch)       History of Present Illness (HPI)     Grant Askew is a 39 y.o. Black male presenting in clinic today for Recurrent Skin Infections (Jock itch). PMH HLD (diet controlled), low back pain. Did not complete labs before visit.    Patient reports black discoloration and pruritus in the groin and thigh areas where he shaves, accompanied by hyperhidrosis in the affected regions.     Quit smoking cigars on 2024. Drinks alcohol occasionally, smokes marijuana. Denies chest pain, shortness of breath, cough, headache, dizziness, weakness, abdominal pain, nausea, vomiting, diarrhea, constipation, dysuria, depression, anxiety, SI, and HI.       Review of Systems     Review of Systems   Constitutional: Negative.    HENT: Negative.     Eyes: Negative.    Respiratory: Negative.     Cardiovascular: Negative.    Gastrointestinal: Negative.    Endocrine: Negative.    Genitourinary: Negative.    Musculoskeletal: Negative.    Skin:  Positive for rash.   Allergic/Immunologic: Negative.    Neurological: Negative.    Hematological: Negative.    Psychiatric/Behavioral: Negative.     All other systems reviewed and are negative.      Medical / Social / Family History   History reviewed. No pertinent past medical history.    History reviewed. No pertinent surgical history.    Social History  Grant Askew's  reports that he quit smoking about a year ago. His smoking use included cigars. He has never used smokeless tobacco. He reports current alcohol use. He reports current drug use. Drug: Marijuana.    Family History  Grant Vasquezs family history includes Diabetes in his mother; Glaucoma in his mother; Hypertension in his mother.    Medications and Allergies     Medications  Current Outpatient Medications   Medication Instructions    fluconazole (DIFLUCAN) 150 mg, Oral,  "Daily    nystatin (MYCOSTATIN) powder Topical (Top), 2 times daily       Allergies  Review of patient's allergies indicates:  No Known Allergies    Physical Examination   Visit Vitals  /73 (BP Location: Right arm, Patient Position: Sitting)   Pulse 90   Temp 98.1 °F (36.7 °C) (Oral)   Resp 16   Ht 5' 8" (1.727 m)   Wt 114.6 kg (252 lb 11.2 oz)   SpO2 99%   BMI 38.42 kg/m²     Physical Exam  Vitals reviewed.   Constitutional:       Appearance: Normal appearance. He is obese.   HENT:      Head: Normocephalic.      Nose: Nose normal.      Mouth/Throat:      Mouth: Mucous membranes are moist.      Pharynx: Oropharynx is clear.   Eyes:      Extraocular Movements: Extraocular movements intact.      Conjunctiva/sclera: Conjunctivae normal.      Pupils: Pupils are equal, round, and reactive to light.   Cardiovascular:      Rate and Rhythm: Normal rate and regular rhythm.      Heart sounds: Normal heart sounds.   Pulmonary:      Effort: Pulmonary effort is normal.      Breath sounds: Normal breath sounds.   Abdominal:      General: Abdomen is flat. Bowel sounds are normal.      Palpations: Abdomen is soft.   Musculoskeletal:         General: Normal range of motion.      Cervical back: Normal range of motion.   Skin:     General: Skin is warm and dry.      Capillary Refill: Capillary refill takes less than 2 seconds.   Neurological:      General: No focal deficit present.      Mental Status: He is alert and oriented to person, place, and time.   Psychiatric:         Mood and Affect: Mood normal.           Results     Lab Results   Component Value Date    WBC 9.21 06/27/2024    RBC 5.32 06/27/2024    HGB 14.7 06/27/2024    HCT 45.4 06/27/2024    MCV 85.3 06/27/2024    MCH 27.6 06/27/2024    MCHC 32.4 (L) 06/27/2024    RDW 13.2 06/27/2024     06/27/2024    MPV 9.5 06/27/2024      Lab Results   Component Value Date     06/27/2024    K 4.9 06/27/2024    CO2 26 06/27/2024    GLUCOSE 94 06/27/2024    BUN 17.0 " 06/27/2024    CREATININE 1.42 (H) 06/27/2024    LABPROT 7.6 06/27/2024    ALBUMIN 4.4 06/27/2024    BILITOT 0.4 06/27/2024    ALKPHOS 41 06/27/2024    AST 24 06/27/2024    ALT 18 06/27/2024    AGAP 5.0 06/27/2024    EGFRNORACEVR >60 06/27/2024     Lab Results   Component Value Date    TSH 2.496 09/19/2023     Lab Results   Component Value Date    CHOL 185 06/27/2024    HDL 37 06/27/2024    .00 06/27/2024    TRIG 71 06/27/2024     Lab Results   Component Value Date    SGUA 1.011 03/19/2024    PROTEINUA Negative 03/19/2024    BILIRUBINUA Negative 03/19/2024    WBCUA 0-5 03/19/2024    RBCUA 0-5 03/19/2024    BACTERIA None Seen 03/19/2024    LEUKOCYTESUR Negative 03/19/2024    UROBILINOGEN Normal 03/19/2024     Lab Results   Component Value Date    CREATRANDUR 232.0 (H) 09/19/2023    MICALBCREAT 2.7 09/19/2023     Lab Results   Component Value Date    [id number] 32.5 09/19/2023     Lab Results   Component Value Date    HIV Nonreactive 03/19/2024    HEPAIGM Nonreactive 03/19/2024    HEPBSAG Nonreactive 03/19/2024    HEPCAB Nonreactive 03/19/2024       Assessment and Plan (including Health Maintenance)     Problem List Items Addressed This Visit          Derm    Tinea cruris - Primary    Current Assessment & Plan   Rx fluconazole.  Rx nystatin powder.  Wear cotton underwear, avoid tight fitting pants.            Relevant Medications    fluconazole (DIFLUCAN) 150 MG Tab    nystatin (MYCOSTATIN) powder       Endocrine    Class 2 severe obesity due to excess calories with serious comorbidity and body mass index (BMI) of 38.0 to 38.9 in adult    Current Assessment & Plan   Body mass index is 38.42 kg/m².  Goal BMI <30.  Aerobic exercise 150 minutes per week.  Avoid soda, simple sugars, sweets, excessive rice, pasta, potatoes or bread.   Choose brown options when available and portion control.  Limit fast foods and fried foods.   Choose complex carbs in moderation (ex: green, leafy vegetables, beans, oatmeal).  Eat  plenty of fresh fruits and vegetables with lean meats daily.   Consider permanent healthy lifestyle changes.             Other    Cigar smoker    Current Assessment & Plan   Smoking cessation discussed for 3 minutes.   Pt not ready to quit.  Declines referral to Smoking Cessation program.  Discussed benefits of quitting including improved health, decreased cardiac/vascular/pulmonary/stroke risks as well as saving money.             Health Maintenance Due   Topic Date Due    COVID-19 Vaccine (1 - 2024-25 season) Never done     All of your core healthy metrics are met.      Health Maintenance Topics with due status: Not Due       Topic Last Completion Date    TETANUS VACCINE 08/02/2022    Hemoglobin A1c (Diabetic Prevention Screening) 09/19/2023    Lipid Panel 06/27/2024    RSV Vaccine (Age 60+ and Pregnant patients) Not Due       Future Appointments   Date Time Provider Department Center   10/2/2025 12:40 PM Bri Vaughn FNP [facility]        Follow up in about 6 months (around 9/30/2025) for F2F, Lab review, RTC PRN, Wellness, Med check.          Signature:        KATIUSKA Gómez  [facility]  [address] [id number]    Date of encounter: 3/31/25    This note was generated with the assistance of ambient listening technology. Verbal consent was obtained by the patient and accompanying visitor(s) for the recording of patient appointment to facilitate this note. I attest to having reviewed and edited the generated note for accuracy, though some syntax or spelling errors may persist. Please contact the author of this note for any clarification.

## 2025-04-01 PROBLEM — F17.210 CIGARETTE NICOTINE DEPENDENCE, UNCOMPLICATED: Status: RESOLVED | Noted: 2023-10-19 | Resolved: 2025-04-01

## 2025-04-01 PROBLEM — G89.29 CHRONIC BILATERAL LOW BACK PAIN WITHOUT SCIATICA: Status: RESOLVED | Noted: 2023-08-10 | Resolved: 2025-04-01

## 2025-04-01 PROBLEM — M54.50 CHRONIC BILATERAL LOW BACK PAIN WITHOUT SCIATICA: Status: RESOLVED | Noted: 2023-08-10 | Resolved: 2025-04-01
